# Patient Record
Sex: FEMALE | Race: WHITE | NOT HISPANIC OR LATINO | Employment: FULL TIME | URBAN - METROPOLITAN AREA
[De-identification: names, ages, dates, MRNs, and addresses within clinical notes are randomized per-mention and may not be internally consistent; named-entity substitution may affect disease eponyms.]

---

## 2017-07-28 ENCOUNTER — OFFICE VISIT - RIVER FALLS (OUTPATIENT)
Dept: FAMILY MEDICINE | Facility: CLINIC | Age: 27
End: 2017-07-28

## 2017-07-28 ASSESSMENT — MIFFLIN-ST. JEOR: SCORE: 1209.14

## 2017-08-29 ENCOUNTER — OFFICE VISIT - RIVER FALLS (OUTPATIENT)
Dept: FAMILY MEDICINE | Facility: CLINIC | Age: 27
End: 2017-08-29

## 2017-08-29 ASSESSMENT — MIFFLIN-ST. JEOR: SCORE: 1221.84

## 2017-09-20 ENCOUNTER — OFFICE VISIT - RIVER FALLS (OUTPATIENT)
Dept: FAMILY MEDICINE | Facility: CLINIC | Age: 27
End: 2017-09-20

## 2017-09-20 ASSESSMENT — MIFFLIN-ST. JEOR: SCORE: 1214.58

## 2018-04-19 ENCOUNTER — OFFICE VISIT - RIVER FALLS (OUTPATIENT)
Dept: FAMILY MEDICINE | Facility: CLINIC | Age: 28
End: 2018-04-19

## 2018-04-19 ASSESSMENT — MIFFLIN-ST. JEOR: SCORE: 1283.53

## 2019-03-18 ENCOUNTER — OFFICE VISIT - RIVER FALLS (OUTPATIENT)
Dept: FAMILY MEDICINE | Facility: CLINIC | Age: 29
End: 2019-03-18

## 2019-09-13 ENCOUNTER — OFFICE VISIT - RIVER FALLS (OUTPATIENT)
Dept: FAMILY MEDICINE | Facility: CLINIC | Age: 29
End: 2019-09-13

## 2019-09-13 ASSESSMENT — MIFFLIN-ST. JEOR: SCORE: 1274.46

## 2019-09-18 ENCOUNTER — TRANSFERRED RECORDS (OUTPATIENT)
Dept: HEALTH INFORMATION MANAGEMENT | Facility: CLINIC | Age: 29
End: 2019-09-18

## 2019-09-18 ENCOUNTER — COMMUNICATION - RIVER FALLS (OUTPATIENT)
Dept: FAMILY MEDICINE | Facility: CLINIC | Age: 29
End: 2019-09-18

## 2019-09-18 LAB — HPV ABSTRACT: NORMAL

## 2020-01-13 ENCOUNTER — OFFICE VISIT - RIVER FALLS (OUTPATIENT)
Dept: FAMILY MEDICINE | Facility: CLINIC | Age: 30
End: 2020-01-13

## 2020-01-13 ASSESSMENT — MIFFLIN-ST. JEOR: SCORE: 1294.42

## 2020-10-13 ENCOUNTER — OFFICE VISIT - RIVER FALLS (OUTPATIENT)
Dept: FAMILY MEDICINE | Facility: CLINIC | Age: 30
End: 2020-10-13

## 2020-11-11 ENCOUNTER — OFFICE VISIT - RIVER FALLS (OUTPATIENT)
Dept: FAMILY MEDICINE | Facility: CLINIC | Age: 30
End: 2020-11-11

## 2020-11-11 ASSESSMENT — MIFFLIN-ST. JEOR: SCORE: 1345.46

## 2021-11-15 ENCOUNTER — OFFICE VISIT - RIVER FALLS (OUTPATIENT)
Dept: FAMILY MEDICINE | Facility: CLINIC | Age: 31
End: 2021-11-15

## 2021-11-15 ASSESSMENT — MIFFLIN-ST. JEOR: SCORE: 1337.17

## 2021-12-08 ENCOUNTER — OFFICE VISIT - RIVER FALLS (OUTPATIENT)
Dept: FAMILY MEDICINE | Facility: CLINIC | Age: 31
End: 2021-12-08

## 2021-12-08 ASSESSMENT — MIFFLIN-ST. JEOR: SCORE: 1329

## 2022-01-31 ENCOUNTER — OFFICE VISIT - RIVER FALLS (OUTPATIENT)
Dept: FAMILY MEDICINE | Facility: CLINIC | Age: 32
End: 2022-01-31

## 2022-02-11 VITALS
DIASTOLIC BLOOD PRESSURE: 66 MMHG | HEART RATE: 63 BPM | TEMPERATURE: 97.4 F | OXYGEN SATURATION: 99 % | WEIGHT: 141.4 LBS | SYSTOLIC BLOOD PRESSURE: 106 MMHG

## 2022-02-12 VITALS
HEIGHT: 62 IN | DIASTOLIC BLOOD PRESSURE: 62 MMHG | TEMPERATURE: 98.3 F | OXYGEN SATURATION: 97 % | SYSTOLIC BLOOD PRESSURE: 118 MMHG | BODY MASS INDEX: 26.13 KG/M2 | HEART RATE: 75 BPM | WEIGHT: 142 LBS

## 2022-02-12 VITALS
WEIGHT: 152.3 LBS | TEMPERATURE: 98.6 F | HEIGHT: 61 IN | SYSTOLIC BLOOD PRESSURE: 112 MMHG | OXYGEN SATURATION: 99 % | DIASTOLIC BLOOD PRESSURE: 72 MMHG | DIASTOLIC BLOOD PRESSURE: 62 MMHG | BODY MASS INDEX: 28.42 KG/M2 | OXYGEN SATURATION: 98 % | HEART RATE: 72 BPM | BODY MASS INDEX: 28.75 KG/M2 | WEIGHT: 150.5 LBS | HEART RATE: 59 BPM | TEMPERATURE: 97.9 F | SYSTOLIC BLOOD PRESSURE: 120 MMHG | HEIGHT: 61 IN

## 2022-02-12 VITALS
TEMPERATURE: 98.5 F | HEART RATE: 80 BPM | WEIGHT: 139.6 LBS | DIASTOLIC BLOOD PRESSURE: 66 MMHG | SYSTOLIC BLOOD PRESSURE: 118 MMHG | BODY MASS INDEX: 25.69 KG/M2 | HEIGHT: 62 IN

## 2022-02-12 VITALS
TEMPERATURE: 97.5 F | WEIGHT: 123.2 LBS | DIASTOLIC BLOOD PRESSURE: 60 MMHG | WEIGHT: 126 LBS | SYSTOLIC BLOOD PRESSURE: 124 MMHG | DIASTOLIC BLOOD PRESSURE: 68 MMHG | HEIGHT: 62 IN | BODY MASS INDEX: 22.67 KG/M2 | HEIGHT: 62 IN | TEMPERATURE: 98.9 F | SYSTOLIC BLOOD PRESSURE: 110 MMHG | HEART RATE: 74 BPM | HEART RATE: 62 BPM | BODY MASS INDEX: 23.19 KG/M2

## 2022-02-12 VITALS
TEMPERATURE: 97.9 F | SYSTOLIC BLOOD PRESSURE: 110 MMHG | BODY MASS INDEX: 22.89 KG/M2 | WEIGHT: 124.4 LBS | HEIGHT: 62 IN | DIASTOLIC BLOOD PRESSURE: 70 MMHG | HEART RATE: 56 BPM

## 2022-02-12 VITALS
HEIGHT: 61 IN | HEART RATE: 60 BPM | RESPIRATION RATE: 16 BRPM | BODY MASS INDEX: 29.27 KG/M2 | WEIGHT: 155 LBS | SYSTOLIC BLOOD PRESSURE: 134 MMHG | DIASTOLIC BLOOD PRESSURE: 70 MMHG | TEMPERATURE: 99 F

## 2022-02-12 VITALS
TEMPERATURE: 97 F | WEIGHT: 137.6 LBS | HEART RATE: 72 BPM | DIASTOLIC BLOOD PRESSURE: 70 MMHG | BODY MASS INDEX: 25.32 KG/M2 | SYSTOLIC BLOOD PRESSURE: 128 MMHG | HEIGHT: 62 IN

## 2022-02-15 NOTE — NURSING NOTE
CAGE Assessment Entered On:  11/12/2020 9:15 AM CST    Performed On:  11/11/2020 9:15 AM CST by Shivani Goodman MA               Assessment   Have you ever felt you should cut down on your drinking :   No   Have people annoyed you by criticizing your drinking :   No   Have you ever felt bad or guilty about your drinking :   No   Have you ever taken a drink first thing in the morning to steady your nerves or get rid of a hangover (Eye-opener) :   No   CAGE Score :   0    Shivani Goodman MA - 11/12/2020 9:15 AM CST

## 2022-02-15 NOTE — PROGRESS NOTES
Patient:   RONAK LEWIS            MRN: [id number]            FIN: [id number]               Age:   27 years     Sex:  Female     :  1990   Associated Diagnoses:   XIOMARA (generalized anxiety disorder)   Author:   Kennedi Jasmine      Chief Complaint   2017 10:22 AM CDT   f/u meds        History of Present Illness   concerning symptoms as listed in CC discussed and confirmed with pt  PHQ and CAGE and XIOMARA scoring reviewed with pt--significant improvement  She has been on sertraline about 3 weeks and very pleased with results  Feels much more in control of anxiety  Son started  so that was difficult  Mom notices improvement in symptoms, more level      Review of Systems   All other systems.     Health Status   Allergies:    Allergic Reactions (Selected)  No Known Medication Allergies   Medications:  (Selected)   Prescriptions  Prescribed  sertraline 50 mg oral tablet: 1 tab(s) ( 50 mg ), PO, Daily, # 90 tab(s), 3 Refill(s), Type: Maintenance, Pharmacy: [id number], 1 tab(s) po daily  Documented Medications  Documented  Mirena 52 mg intrauterine device: 1 EA ( 52 mg ), intrauteral, once, Instructions: Lot:  [id number]  Exp:  2018, 0 Refill(s), Type: Maintenance   Problem list:    All Problems  XIOMARA (generalized anxiety disorder) / SNOMED CT 87813518 / Confirmed      Histories   Past Medical History:    No active or resolved past medical history items have been selected or recorded.   Family History:       Procedure history:    No active procedure history items have been selected or recorded.   Social History:        Alcohol Assessment            Current, Wine (5 oz), 1-2 times per week, 1 drinks/episode average.      Tobacco Assessment            Never smoker      Exercise and Physical Activity Assessment            Exercise frequency: 5-6 times/week.  Exercise type: Running.      Sexual Assessment            Sexually active: Yes.  Sexual orientation: Heterosexual.        Physical  Examination   Vital Signs   9/20/2017 10:22 AM CDT Temperature Tympanic 97.9 DegF    Peripheral Pulse Rate 56 bpm  LOW    Pulse Site Radial artery    HR Method Electronic    Systolic Blood Pressure 110 mmHg    Diastolic Blood Pressure 70 mmHg    Mean Arterial Pressure 83 mmHg    BP Site Right arm    BP Method Manual      Measurements from flowsheet : Measurements   9/20/2017 10:22 AM CDT Height Measured - Standard 61.5 in    Weight Measured - Standard 124.4 lb    BSA 1.56 m2    Body Mass Index 23.12 kg/m2      General:  Alert and oriented, No acute distress, good eye contact, engaging with conversation.    Psychiatric:  Cooperative, Appropriate mood & affect, Normal judgment, Non-suicidal.       Impression and Plan   Diagnosis     XIOMARA (generalized anxiety disorder) ([id number] F41.1).     Patient Instructions:  Lifestyle risk factors.         Limitations: Alcohol consumption.         Counseled: Patient, Regarding diagnosis, Regarding treatment, Regarding medications, Diet, Activity, Verbalized understanding, 10 min in counseling,   will continue same dose, if she needs to increase dose can call but plan to stay on this 1 year then re evaluated.    Orders     Orders (Selected)   Prescriptions  Prescribed  sertraline 50 mg oral tablet: 1 tab(s) ( 50 mg ), PO, Daily, # 90 tab(s), 3 Refill(s), Type: Maintenance, Pharmacy: [id number], 1 tab(s) po daily.

## 2022-02-15 NOTE — TELEPHONE ENCOUNTER
Entered by [place] Mark LPN on August 07, 2020 9:39:48 AM CDT  ---------------------  From: [place] Mark LPN   To: [facility] #[id number]    Sent: 8/7/2020 9:39:48 AM CDT  Subject: Medication Management     ** Submitted: **  Order:citalopram (citalopram 20 mg oral tablet)  1 tab(s)  Oral  daily  Qty:  30 tab(s)        Refills:  0          Substitutions Allowed     [facility] #[id number]    Signed by [place] Mark LPN  8/7/2020 2:38:00 PM [facility]    ** Submitted: **  Complete:citalopram (citalopram 20 mg oral tablet)   Signed by [place] Mark LPN  8/7/2020 2:39:00 PM [facility]    ** Not Approved:  **  citalopram (CITALOPRAM 20MG TABLETS)  TAKE 1 TABLET BY MOUTH DAILY  Qty:  90 tab(s)        Days Supply:  90        Refills:  0          Substitutions Allowed     [facility][id number]   Signed by [place] Mark LPN            ------------------------------------------  From: [facility] #[id number]  To: Kennedi Jasmine  Sent: August 7, 2020 3:43:07 AM CDT  Subject: Medication Management  Due: July 28, 2020 10:28:08 PM CDT     ** On Hold Pending Signature **     Dispensed Drug: citalopram (citalopram 20 mg oral tablet), TAKE 1 TABLET BY MOUTH DAILY  Quantity: 90 tab(s)  Days Supply: 90  Refills: 0  Substitutions Allowed  Notes from Pharmacy:  ------------------------------------------

## 2022-02-15 NOTE — NURSING NOTE
Generalized Anxiety Disorder Screening Entered On:  3/19/2019 8:27 AM CDT    Performed On:  3/18/2019 8:26 AM CDT by Faith Thibodeaux MA               Generalized Anxiety Disorder Screening   XIOMARA Nervous, Anxious On Edge :   Several days   XIOMARA Control Worrying B :   Not at all   XIOMARA Worrying Too Much :   Several days   XIOMARA Restless :   Several days   XIOMARA Easily Annoyed/Irritable :   Not at all   XIOMARA Afraid :   Not at all   XIOMARA Trouble Relaxing :   Several days   XIOMARA Total Screening Score :   4    XIOMARA Difficulty with Work, Home, Others :   Not difficult at all   Faith Thibodeaux MA - 3/19/2019 8:26 AM CDT

## 2022-02-15 NOTE — NURSING NOTE
Comprehensive Intake Entered On:  11/11/2020 2:08 PM CST    Performed On:  11/11/2020 2:01 PM CST by Alfredo Hugo CMA               Summary   Chief Complaint :   Pt here for Px and medication refills.   Menstrual Status :   Prophylaxis   Weight Measured :   155 lb(Converted to: 155 lb 0 oz, 70.307 kg)    Height Measured :   61 in(Converted to: 5 ft 1 in, 154.94 cm)    Body Mass Index :   29.28 kg/m2 (HI)    Body Surface Area :   1.74 m2   Systolic Blood Pressure :   134 mmHg (HI)    Diastolic Blood Pressure :   70 mmHg   Mean Arterial Pressure :   91 mmHg   Peripheral Pulse Rate :   60 bpm   BP Site :   Right arm   Pulse Site :   Radial artery   Temperature Tympanic :   99 DegF(Converted to: 37.2 DegC)    Respiratory Rate :   16 br/min   Alfredo Hugo CMA - 11/11/2020 2:01 PM CST   Health Status   Allergies Verified? :   Yes   Medication History Verified? :   Yes   Medical History Verified? :   Yes   Pre-Visit Planning Status :   Completed   Tobacco Use? :   Former smoker   Alfredo Hugo CMA - 11/11/2020 2:01 PM CST   Meds / Allergies   (As Of: 11/11/2020 2:08:19 PM CST)   Allergies (Active)   No Known Medication Allergies  Estimated Onset Date:   Unspecified ; Created By:   Raven London CMA; Reaction Status:   Active ; Category:   Drug ; Substance:   No Known Medication Allergies ; Type:   Allergy ; Updated By:   Raven London CMA; Reviewed Date:   11/11/2020 2:05 PM CST        Medication List   (As Of: 11/11/2020 2:08:19 PM CST)   Prescription/Discharge Order    fluticasone nasal  :   fluticasone nasal ; Status:   Prescribed ; Ordered As Mnemonic:   Flonase 50 mcg/inh nasal spray ; Simple Display Line:   2 spray(s), Nasal, daily, 1 bottle(s), 0 Refill(s) ; Ordering Provider:   Elsa Amaya MD; Catalog Code:   fluticasone nasal ; Order Dt/Tm:   1/13/2020 6:29:08 PM CST          drospirenone-ethinyl estradiol  :   drospirenone-ethinyl estradiol ; Status:   Prescribed ; Ordered As Mnemonic:    drospirenone-ethinyl estradiol 3 mg-0.02 mg oral tablet ; Simple Display Line:   1 tab(s), Oral, daily, 28 tab(s), 0 Refill(s) ; Ordering Provider:   Kennedi Jasmine; Catalog Code:   drospirenone-ethinyl estradiol ; Order Dt/Tm:   10/23/2020 6:06:25 AM CDT          citalopram  :   citalopram ; Status:   Prescribed ; Ordered As Mnemonic:   citalopram 20 mg oral tablet ; Simple Display Line:   1 tab(s), Oral, daily, appt scheduled for 10/13/20 - will address additional refills at that time, 30 tab(s), 0 Refill(s) ; Ordering Provider:   Kennedi Jasmine; Catalog Code:   citalopram ; Order Dt/Tm:   9/29/2020 9:58:41 AM CDT            Home Meds    levonorgestrel  :   levonorgestrel ; Status:   Documented ; Ordered As Mnemonic:   Mirena 52 mg intrauterine device ; Simple Display Line:   52 mg, 1 EA, intrauteral, once, Lot:  [id number]  Exp:  09/2018, 0 Refill(s) ; Catalog Code:   levonorgestrel ; Order Dt/Tm:   7/29/2016 3:53:30 PM CDT            ID Risk Screen   Recent Travel History :   No recent travel   Family Member Travel History :   No recent travel   Other Exposure to Infectious Disease :   Unknown   Alfredo Hugo CMA - 11/11/2020 2:01 PM CST   Social History   Social History   (As Of: 11/11/2020 2:08:19 PM CST)   Alcohol:        Current, Wine (5 oz), 1-2 times per month, 2 drinks/episode average.  Ready to change: No.   (Last Updated: 9/25/2019 2:24:28 PM CDT by Maribell Ruiz)          Tobacco:        Never smoker   (Last Updated: 8/1/2016 7:11:48 PM CDT by Raven London CMA)   Former smoker, quit more than 30 days ago   Comments:  9/25/2019 2:22 PM - Maribell Ruiz: Quit August 2009   (Last Updated: 9/25/2019 2:22:30 PM CDT by Maribell Ruiz)          Substance Abuse:        Never   (Last Updated: 9/25/2019 2:24:10 PM CDT by Maribell Ruiz)          Employment/School:        Employed, Work/School description: .  Highest education level: MRA .   (Last Updated: 9/25/2019 2:25:17 PM CDT by Maribell Ruiz)           Home/Environment:        Marital status: .  2 children.  Living situation: Home/Independent.  Injuries/Abuse/Neglect in household: No.  Feels unsafe at home: No.  Family/Friends available for support: Yes.   (Last Updated: 9/25/2019 2:26:34 PM CDT by Maribell Ruiz)          Nutrition/Health:        Type of diet: Regular.  Wants to lose weight: Yes.  Sleeping concerns: No.  Feels highly stressed: Yes.   (Last Updated: 9/25/2019 2:26:16 PM CDT by Maribell Ruiz)          Exercise:        Exercise frequency: 5-6 times/week.  Exercise type: Running, Walking, Yoga.   (Last Updated: 9/25/2019 2:26:26 PM CDT by Maribell Ruiz)          Sexual:        Sexually active: Yes.  Sexual orientation: Heterosexual.   (Last Updated: 8/1/2016 7:12:57 PM CDT by Raven London CMA)   Sexually active: Yes.  Identifies as female, Sexual orientation: Straight or heterosexual.  History of STD: No.  Uses condoms: No.  Contraceptive Use Details: Intrauterine device.   (Last Updated: 9/25/2019 2:26:53 PM CDT by Maribell Ruiz)

## 2022-02-15 NOTE — TELEPHONE ENCOUNTER
Entered by [place] Mark LPN on September 29, 2020 10:00:18 AM CDT  ---------------------  From: [place] Mark LPN   [id number]    Sent: 9/29/2020 10:00:18 AM CDT  Subject: Medication Management     ** Submitted: **  Order:drospirenone-ethinyl estradiol (drospirenone-ethinyl estradiol 3 mg-0.02 mg oral tablet)  1 tab(s)  Oral  daily  appt scheduled 10/13/20 - will address additional refills at that time  Qty:  28 tab(s)        Days Supply:  28        Refills:  0          Substitutions Allowed     Route To [facility] #[id number]    Signed by [place] Mark LPN  9/29/2020 2:59:00 PM [place]    ** Submitted: **  Complete:drospirenone-ethinyl estradiol (drospirenone-ethinyl estradiol 3 mg-0.02 mg oral tablet)   Signed by [place] Mark LPN  9/29/2020 3:00:00 PM [place]    ** Not Approved:  **  drospirenone-ethinyl estradiol (DROSPIRENONE/ETHY EST 3/0.02MG T 28)  TAKE 1 TABLET BY MOUTH DAILY  Qty:  28 tab(s)        Days Supply:  28        Refills:  0          Substitutions Allowed     Route To [facility] #[id number]   Signed by [place] Mark LPN            ** Submitted: **  Order:citalopram (citalopram 20 mg oral tablet)  1 tab(s)  Oral  daily  appt scheduled for 10/13/20 - will address additional refills at that time  Qty:  30 tab(s)        Days Supply:  30        Refills:  0          Substitutions Allowed     [id number]    Signed by [place] Mark LPN  9/29/2020 2:58:00 PM [place]    ** Submitted: **  Complete:citalopram (citalopram 20 mg oral tablet)   Signed by [place] Mark LPN  9/29/2020 2:59:00 PM UTC    ** Not Approved:  **  citalopram (CITALOPRAM 20MG TABLETS)  TAKE 1 TABLET BY MOUTH DAILY  Qty:  30 tab(s)        Days Supply:  30        Refills:  0          Substitutions Allowed     Route To Pharmacy - [facility] #[id number]   Signed by Jas WILSON,  [place]            ------------------------------------------  From: [facility] #[id number]  To: Kennedi Jasmine  Sent: September 27, 2020 9:03:12 AM CDT  Subject: Medication Management  Due: September 9, 2020 4:21:06 PM CDT     ** On Hold Pending Signature **     Dispensed Drug: drospirenone-ethinyl estradiol (drospirenone-ethinyl estradiol 3 mg-0.02 mg oral tablet), TAKE 1 TABLET BY MOUTH DAILY  Quantity: 28 tab(s)  Days Supply: 28  Refills: 0  Substitutions Allowed  Notes from Pharmacy:     ** On Hold Pending Signature **     Dispensed Drug: citalopram (citalopram 20 mg oral tablet), TAKE 1 TABLET BY MOUTH DAILY  Quantity: 30 tab(s)  Days Supply: 30  Refills: 0  Substitutions Allowed  Notes from Pharmacy:  ------------------------------------------Renewed both meds x1 month - patient is due for annual exam. She does have an appt scheduled for 10/13/20

## 2022-02-15 NOTE — TELEPHONE ENCOUNTER
---------------------  From: Cassandra Adamson LPN (Phone Messages Pool ([id number]_[facility]))   To: Advanced Practice Provider Pool ([id number]_[facility]);     Sent: 6/26/2020 10:14:01 AM CDT  Subject: OCP     Phone Message    PCP:   [place]      Time of Call:  9:47am       Person Calling:  pt  Phone number:  [phone]    Returned call at: _    Note:   Pt LM asking for refill on OCP at [facility].    Rx was denied this morning as pt's Rx should be good until August.    Called [facility] and they said insurance only allows 30 days at a time. Pt filled 2 times in February, 3/20/20, 4/14/20, 5/8/20 and 5/31/20.    Please advise on further refills. Rx is not for continuous use so I am not comfortable filling per protocol    Last office visit and reason:  1/13/20 Right OM, sinusitis with KSA---------------------  From: Reid Srinivasan PA-C (Advanced Practice Provider Pool ([id number]_[facility]))   To: Phone Messages Pool ([id number]_[place] [facility] [place]);     Sent: 6/26/2020 11:32:03 AM CDT  Subject: RE: OCP     Ok to fill until August.    KAHMedication sent in per KAH. Patient notified.

## 2022-02-15 NOTE — LETTER
(Inserted Image. Unable to display)   July 02, 2019        RONAK LEWIS  [address], [place] [id number]        Dear RONAK,      Thank you for selecting [facility] ([facility]viously [place]) for your healthcare needs.    Our records indicate you are due for the following services:     Annual Physical and Gynecologic Exam ~ Yearly wellness exams are important for your ongoing health and wellness.  This exam gives you the opportunity to meet with your Healthcare Provider to review your health, update immunizations and to recommend preventive screenings that you may be due for.  At your wellness visit, your health care provider will determine the need for a gynecologic exam and/or pap smear.     To schedule an appointment or if you have further questions, please contact your primary clinic:   Replaced by [facility]       [phone]   [facility]       [phone]              [facility]     [phone]      Powered by [employer] and [employer]    Sincerely,    PRABHAKAR Mix

## 2022-02-15 NOTE — NURSING NOTE
Comprehensive Intake Entered On:  1/13/2020 6:22 PM CST    Performed On:  1/13/2020 6:19 PM CST by Esther Jacobson CMA               Summary   Chief Complaint :   Cold sx since Maya which have not quite resolved - still sinus pressure/drainage, R ear feels plugged.    Menstrual Status :   Menarcheal   Weight Measured :   142 lb(Converted to: 142 lb 0 oz, 64.41 kg)    Height Measured :   61.5 in(Converted to: 5 ft 1 in, 156.21 cm)    Body Mass Index :   26.39 kg/m2 (HI)    Body Surface Area :   1.67 m2   Systolic Blood Pressure :   118 mmHg   Diastolic Blood Pressure :   62 mmHg   Mean Arterial Pressure :   81 mmHg   Peripheral Pulse Rate :   75 bpm   BP Site :   Right arm   Pulse Site :   Radial artery   BP Method :   Manual   HR Method :   Electronic   Temperature Tympanic :   98.3 DegF(Converted to: 36.8 DegC)    Oxygen Saturation :   97 %   Esther Jacobson CMA - 1/13/2020 6:19 PM CST   Health Status   Allergies Verified? :   Yes   Medication History Verified? :   Yes   Pre-Visit Planning Status :   Not completed   Esther Jacobson CMA - 1/13/2020 6:19 PM CST   Meds / Allergies   (As Of: 1/13/2020 6:22:18 PM CST)   Allergies (Active)   No Known Medication Allergies  Estimated Onset Date:   Unspecified ; Created By:   Raven London CMA; Reaction Status:   Active ; Category:   Drug ; Substance:   No Known Medication Allergies ; Type:   Allergy ; Updated By:   Raven London CMA; Reviewed Date:   9/13/2019 11:03 AM CDT        Medication List   (As Of: 1/13/2020 6:22:18 PM CST)   Prescription/Discharge Order    citalopram  :   citalopram ; Status:   Prescribed ; Ordered As Mnemonic:   citalopram 20 mg oral tablet ; Simple Display Line:   20 mg, 1 tab(s), Oral, daily, 90 tab(s), 1 Refill(s) ; Ordering Provider:   Kennedi Jasmine; Catalog Code:   citalopram ; Order Dt/Tm:   9/13/2019 11:17:23 AM CDT          drospirenone-ethinyl estradiol  :   drospirenone-ethinyl estradiol ; Status:   Prescribed ; Ordered As  Mnemonic:   Delaney 3 mg-0.02 mg oral tablet ; Simple Display Line:   1 tab(s), Oral, daily, 84 tab(s), 1 Refill(s) ; Ordering Provider:   Kennedi Jasmine; Catalog Code:   drospirenone-ethinyl estradiol ; Order Dt/Tm:   9/13/2019 11:17:51 AM CDT            Home Meds    levonorgestrel  :   levonorgestrel ; Status:   Documented ; Ordered As Mnemonic:   Mirena 52 mg intrauterine device ; Simple Display Line:   52 mg, 1 EA, intrauteral, once, Lot:  [id number]  Exp:  09/2018, 0 Refill(s) ; Catalog Code:   levonorgestrel ; Order Dt/Tm:   7/29/2016 3:53:30 PM CDT

## 2022-02-15 NOTE — TELEPHONE ENCOUNTER
Entered by Court Bird CMA on October 23, 2020 6:07:03 AM CDT  ---------------------  From: Court Bird CMA   To: [facility] #[id number]    Sent: 10/23/2020 6:07:03 AM CDT  Subject: Medication Management     ** Submitted: **  Order:drospirenone-ethinyl estradiol (drospirenone-ethinyl estradiol 3 mg-0.02 mg oral tablet)  1 tab(s)  Oral  daily  Qty:  28 tab(s)        Days Supply:  28        Refills:  0          Substitutions Allowed     Route To [facility] #[id number]    Signed by Court Bird CMA  10/23/2020 11:06:00 AM [facility]    ** Submitted: **  Complete:drospirenone-ethinyl estradiol (drospirenone-ethinyl estradiol 3 mg-0.02 mg oral tablet)   Signed by Court Bird CMA  10/23/2020 11:07:00 AM [facility]    ** Not Approved:  **  drospirenone-ethinyl estradiol (DROSPIRENONE/ETHY EST 3/0.02MG T 28)  TAKE 1 TABLET BY MOUTH DAILY APPOINTMENT SCHEDULED 10/13/20- WILL ADDRESS ADDITIONAL REFILLS AT THAT TIME  Qty:  28 tab(s)        Days Supply:  28        Refills:  0          Substitutions Allowed     Route To [facility] #[id number]   Signed by Court Bird CMA            ------------------------------------------  From: [facility] #[id number]  To: Kennedi Jasmine  Sent: October 22, 2020 11:42:19 AM CDT  Subject: Medication Management  Due: October 8, 2020 2:03:37 PM CDT     ** On Hold Pending Signature **     Dispensed Drug: drospirenone-ethinyl estradiol (drospirenone-ethinyl estradiol 3 mg-0.02 mg oral tablet), TAKE 1 TABLET BY MOUTH DAILY APPOINTMENT SCHEDULED 10/13/20- WILL ADDRESS ADDITIONAL REFILLS AT THAT TIME  Quantity: 28 tab(s)  Days Supply: 28  Refills: 0  Substitutions Allowed  Notes from Pharmacy:  ------------------------------------------has px scheduled 11/11/20

## 2022-02-15 NOTE — PROGRESS NOTES
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     Health Status   Allergies:    Allergic Reactions (Selected)  No Known Medication Allergies   Medications:  (Selected)   Prescriptions  Prescribed  citalopram 10 mg oral tablet: See Instructions, Instructions: 1 tab daily but increase to 2 tabs daily each month for 10 days prior to expected menses, # 120 tab(s), 3 Refill(s), Type: Maintenance, Pharmacy: [id number], 1 tab daily but increase to 2 tabs daily each...  Documented Medications  Documented  Mirena 52 mg intrauterine device: 1 EA ( 52 mg ), intrauteral, once, Instructions: Lot:  [id number]  Exp:  2018, 0 Refill(s), Type: Maintenance   Problem list:    All Problems  XIOMARA (generalized anxiety disorder) / SNOMED CT 98140492 / Confirmed      Histories   Past Medical History:    No active or resolved past medical history items have been selected or recorded.   Family History:       Procedure history:    No active procedure history items have been selected or recorded.   Social History:        Alcohol Assessment            Current, Wine (5 oz), 1-2 times per week, 1 drinks/episode average.      Tobacco Assessment            Never smoker       Exercise and Physical Activity Assessment            Exercise frequency: 5-6 times/week.  Exercise type: Running.      Sexual Assessment            Sexually active: Yes.  Sexual orientation: Heterosexual.      Physical Examination   Vital Signs   3/18/2019 12:49 PM CDT Temperature Tympanic 97.4 DegF  LOW    Peripheral Pulse Rate 63 bpm    HR Method Electronic    Systolic Blood Pressure 106 mmHg    Diastolic Blood Pressure 66 mmHg    Mean Arterial Pressure 79 mmHg    BP Site Right arm    BP Method Manual    Oxygen Saturation 99 %      Measurements from flowsheet : Measurements   3/18/2019 12:49 PM CDT   Weight Measured - Standard                141.4 lb     General:  Alert and oriented, No acute distress, good eye contact, engaging with conversation.    Integumentary:  Warm, Dry, Pink.    Neurologic:  Alert, Oriented, Normal sensory, Normal motor function, No focal deficits.    Psychiatric:  Cooperative, Appropriate mood & affect, Normal judgment, Non-suicidal.       Impression and Plan   Diagnosis     XIOAMRA (generalized anxiety disorder) (CUE31-DQ F41.1).     Patient Instructions:  Lifestyle risk factors.         Limitations: Alcohol consumption.         Counseled: Patient, Regarding diagnosis, Regarding treatment, Regarding medications, Diet, Activity, Verbalized understanding, counseled 15 min on possible causes of cyclical anxiety, likely low progesterone level  could add back progesterone 7-10 days prior to period or increase Citalopram each month during those 10 days of cycle. She would like to do the latter and she will see me in 3-6 months for annual exam as Pap will be due.    Orders     Orders (Selected)   Prescriptions  Prescribed  citalopram 10 mg oral tablet: See Instructions, Instructions: 1 tab daily but increase to 2 tabs daily each month for 10 days prior to expected menses, # 120 tab(s), 3 Refill(s), Type: Maintenance, Pharmacy: [id number], 1 tab daily but increase to 2 tabs daily  each....

## 2022-02-15 NOTE — PROGRESS NOTES
Patient:   RONAK LEWIS            MRN: [id number]            FIN: [id number]               Age:   30 years     Sex:  Female     :  1990   Associated Diagnoses:   Well adult; XIOMARA (generalized anxiety disorder)   Author:   Kennedi Jasmine      Visit Information      Date of Service: 2020 01:56 pm  Performing Location: [facility]  Encounter#: [id number]      Primary Care Provider (PCP):  Kennedi Jasmine    NPI# [id number]      Referring Provider:  Kennedi Jasmine NPI# [id number]      Chief Complaint   2020 2:01 PM CST   Pt here for Px and medication refills.        Well Adult History   Well Adult History             The patient presents for well adult exam, Ronak is doing well, partnering with Jose is raising the kids now both school aged  working at home, parents/in laws helping with kid's schoolling  Declines flu shot  pap is UTD  Really happy with using oc's on top of Mirena as it helps emmensely with cystic acne and with mood. She is concerned about #15 wt gain, has gained wt with oc us in past. She has no menses with mirena. Informed it is now approved for contraception for 6 years. She will try cutting oc's in past to help with the above and hopefully help with some wt loss.  The general health status is good.  The patient's diet is described as balanced.  Exercise: routine, running nearly 3 miles daily.  Associated symptoms consist of weight gain.  Last menstrual period:.  Medical encounters:.        Review of Systems   Constitutional:  Negative except as documented in history of present illness.    Eye:  Negative except as documented in history of present illness.    Respiratory:  Negative except as documented in history of present illness.    Cardiovascular:  Negative except as documented in history of present illness.    Breast:  Negative.    Gastrointestinal:  Negative except as documented in history of present illness.    Genitourinary:  Negative except as  documented in history of present illness.    Gynecologic:  Negative except as documented in history of present illness.    Hematology/Lymphatics:  Negative except as documented in history of present illness.    Endocrine:  Negative except as documented in history of present illness.    Immunologic:  Negative except as documented in history of present illness.    Musculoskeletal:  Negative except as documented in history of present illness.    Integumentary:  Negative except as documented in history of present illness.    Neurologic:  Negative except as documented in history of present illness.    Psychiatric:  Negative except as documented in history of present illness.              Health Status   Allergies:    Allergic Reactions (Selected)  No Known Medication Allergies   Medications:  (Selected)   Prescriptions  Prescribed  Flonase 50 mcg/inh nasal spray: = 2 spray(s), Nasal, daily, # 1 bottle(s), 0 Refill(s), Type: Maintenance, Pharmacy: [facility] #[id number], 2 spray(s) Nasal daily  citalopram 20 mg oral tablet: = 1 tab(s), Oral, daily, # 90 tab(s), 3 Refill(s), Type: Maintenance, Pharmacy: [facility] #[id number], 1 tab(s) Oral daily, 61, in, 11/11/20 14:01:00 CST, Height Measured, 155, lb, 11/11/20 14:01:00 CST, Weight Measured  drospirenone-ethinyl estradiol 3 mg-0.02 mg oral tablet: 1 tab(s), Oral, daily, # 84 tab(s), 3 Refill(s), Type: Maintenance, Pharmacy: [facility] #[id number], 1 tab(s) Oral daily, 61, in, 11/11/20 14:01:00 CST, Height Measured, 155, lb, 11/11/20 14:01:00 CST, Weight Measured  Documented Medications  Documented  Mirena 52 mg intrauterine device: 1 EA ( 52 mg ), intrauteral, once, Instructions: Lot:  [id number]  Exp:  09/2018, 0 Refill(s), Type: Maintenance   Problem list:    All Problems  Anxiety / SNOMED CT 59324525 / Confirmed  XIOMARA (generalized anxiety disorder) / SNOMED CT 43958525 / Confirmed      Histories   Past Medical History:    Active  Anxiety (10004449)   Family  History:    Grandfather (M)  High blood pressure  Migraine  Great Grandmother (M)  Alzheimer's disease  Grandparent  Depression  Mother  Depression  High blood pressure  High cholesterol  Migraine  Father    History is negative.  Sister    History is negative.  Sister    History is negative.  Son: Lb      History is negative.  Son: Stephen      History is negative.     Procedure history:    No active procedure history items have been selected or recorded.   Social History:        Alcohol Assessment            Current, Wine (5 oz), 1-2 times per month, 2 drinks/episode average.  Ready to change: No.      Tobacco Assessment            Never smoker            Former smoker, quit more than 30 days ago                     Comments:                      09/25/2019 - Beer , Candy                     Quit August 2009      Substance Abuse Assessment            Never      Employment and Education Assessment            Employed, Work/School description: .  Highest education level: [employer] .      Home and Environment Assessment            Marital status: .  2 children.  Living situation: Home/Independent.  Injuries/Abuse/Neglect in               household: No.  Feels unsafe at home: No.  Family/Friends available for support: Yes.      Nutrition and Health Assessment            Type of diet: Regular.  Wants to lose weight: Yes.  Sleeping concerns: No.  Feels highly stressed: Yes.      Exercise and Physical Activity Assessment            Exercise frequency: 5-6 times/week.  Exercise type: Running, Walking, Yoga.      Sexual Assessment            Sexually active: Yes.  Sexual orientation: Heterosexual.            Sexually active: Yes.  Identifies as female, Sexual orientation: Straight or heterosexual.  History of STD:               No.  Uses condoms: No.  Contraceptive Use Details: Intrauterine device.        Physical Examination   Vital Signs   11/11/2020 2:01 PM CST Temperature Tympanic 99 DegF    Peripheral  Pulse Rate 60 bpm    Pulse Site Radial artery    Respiratory Rate 16 br/min    Systolic Blood Pressure 134 mmHg  HI    Diastolic Blood Pressure 70 mmHg    Mean Arterial Pressure 91 mmHg    BP Site Right arm      Measurements from flowsheet : Measurements   11/11/2020 2:01 PM CST Height Measured - Standard 61 in    Weight Measured - Standard 155 lb    BSA 1.74 m2    Body Mass Index 29.28 kg/m2  HI      General:  Alert and oriented, No acute distress, vital signs stable, as noted above.    Eye:  Pupils are equal, round and reactive to light, Extraocular movements are intact, Normal conjunctiva.    HENT:  Normocephalic, Tympanic membranes are clear, Normal hearing.    Neck:  Supple, Non-tender, No lymphadenopathy, No thyromegaly.    Respiratory:  Lungs are clear to auscultation, Respirations are non-labored, Breath sounds are equal, Symmetrical chest wall expansion.    Cardiovascular:  Normal rate, Regular rhythm, No murmur, No edema.    Breast:  No mass, No tenderness, No discharge, Breasts examined .  No infra nor supraclavicular nodes palpable.  No axillary nodes or masses palpable.  No nipple discharge. Breasts normal throughout.    Gastrointestinal:  Soft, Non-tender, Non-distended, No organomegaly.    Musculoskeletal:  Normal range of motion, Normal strength, No deformity, Normal gait.    Integumentary:  Warm, Dry, Pink, Intact, No rash.    Neurologic:  Alert, Oriented, Normal sensory, Normal motor function, Cranial Nerves II-XII are grossly intact.    Psychiatric:  Cooperative, Appropriate mood & affect, Normal judgment, PHQ 9/CAGE questionaire reviewed and discussed with patient,  see score.       Impression and Plan   Diagnosis     Well adult (ZZU48-GC Z00.00).     XIOMARA (generalized anxiety disorder) (NGJ04-SV F41.1).     Patient Instructions:       Counseled: Patient, Regarding diagnosis, Regarding medications, Verbalized understanding, counseled on health benefits of healthy weight, regular exercise, healthy  diet.    Orders     Orders (Selected)   Prescriptions  Prescribed  citalopram 20 mg oral tablet: = 1 tab(s), Oral, daily, # 90 tab(s), 3 Refill(s), Type: Maintenance, Pharmacy: [facility] #[id number], 1 tab(s) Oral daily, 61, in, 11/11/20 14:01:00 CST, Height Measured, 155, lb, 11/11/20 14:01:00 CST, Weight Measured  drospirenone-ethinyl estradiol 3 mg-0.02 mg oral tablet: 1 tab(s), Oral, daily, # 84 tab(s), 3 Refill(s), Type: Maintenance, Pharmacy: [facility] #[id number], 1 tab(s) Oral daily, 61, in, 11/11/20 14:01:00 CST, Height Measured, 155, lb, 11/11/20 14:01:00 CST, Weight Measured.     will continue current dose of citalopram but if wants to reduce she will call.

## 2022-02-15 NOTE — NURSING NOTE
Generalized Anxiety Disorder Screening Entered On:  11/16/2021 7:11 AM CST    Performed On:  11/16/2021 7:11 AM CST by [place] Mark LPN               XIOMARA-7   XIOMARA Nervous, Anxious On Edge :   Several days   XIOMARA Control Worrying B :   Several days   XIOMARA Worrying Too Much :   Not at all   XIOMARA Trouble Relaxing :   Several days   XIOMARA Restless :   Several days   XIOMARA Easily Annoyed/Irritable :   Not at all   XIOMARA Afraid :   Not at all   XIOMARA Total Screening Score :   4    XIOMARA Difficulty with Work, Home, Others :   Not difficult at all   [place] Mark LPN - 11/16/2021 7:11 AM CST

## 2022-02-15 NOTE — PROGRESS NOTES
Patient:   RONAK LEWIS            MRN: [id number]            FIN: [id number]               Age:   31 years     Sex:  Female     :  1990   Associated Diagnoses:   Well adult; XIOMARA (generalized anxiety disorder); IUD check up   Author:   Kennedi Jasmine      Visit Information      Date of Service: 11/15/2021 11:52 am  Performing Location: [facility]  Encounter#: [id number]      Primary Care Provider (PCP):  Kennedi Jasmine    NPI# [id number]      Referring Provider:  Kennedi Jsamine NPI# [id number]      Chief Complaint   11/15/2021 12:03 PM CST  1) annual exam 2) check IUD, placed 16, not having any issues        Well Adult History   Well Adult History             The patient presents for well adult exam,  to Jose, raising 7 an d9 year old, has their COVID vaccines scheduled, she has her booster scheduled next weel  Happy with  Mirena, has been just over 5 years.   , she plans to replace next summer. She also uses a half of low dose OC to help 'balance my hormones'. She periodically stops it but simply feels better when she is on it  Citalopram for XIOMARA, well controlled. Meds refilled  pap is UTD  no fh breast or colon cancer.  The general health status is good.  The patient's diet is described as balanced.  Exercise: routine, runs 3-4 miles at lunch daily.  Associated symptoms consist of none.  Last menstrual period: none.  Medical encounters:.        Review of Systems   Constitutional:  Negative except as documented in history of present illness.    Eye:  Negative except as documented in history of present illness.    Respiratory:  Negative except as documented in history of present illness.    Cardiovascular:  Negative except as documented in history of present illness.    Breast:  Negative.    Gastrointestinal:  Negative except as documented in history of present illness.    Genitourinary:  Negative except as documented in history of present illness.    Gynecologic:   Negative except as documented in history of present illness.    Hematology/Lymphatics:  Negative except as documented in history of present illness.    Endocrine:  Negative except as documented in history of present illness.    Immunologic:  Negative except as documented in history of present illness.    Musculoskeletal:  Negative except as documented in history of present illness.    Integumentary:  Negative except as documented in history of present illness.    Neurologic:  Negative except as documented in history of present illness.    Psychiatric:  Negative except as documented in history of present illness.              Health Status   Allergies:    Allergic Reactions (Selected)  No Known Medication Allergies   Medications:  (Selected)   Prescriptions  Prescribed  citalopram 20 mg oral tablet: = 1 tab(s), Oral, daily, # 30 tab(s), 0 Refill(s), Type: Maintenance, Pharmacy: [facility] #[id number], 1 tab(s) Oral daily, 61.25, in, 11/15/21 12:03:00 CST, Height Measured, 152.3, lb, 11/15/21 12:03:00 CST, Weight Measured  drospirenone-ethinyl estradiol 3 mg-0.02 mg oral tablet: 1 tab(s), Oral, daily, # 84 tab(s), 0 Refill(s), Type: Maintenance, Pharmacy: [facility] #[id number], 1 tab(s) Oral daily, 61.25, in, 11/15/21 12:03:00 CST, Height Measured, 152.3, lb, 11/15/21 12:03:00 CST, Weight Measured  Documented Medications  Documented  Mirena 52 mg intrauterine device: 1 EA ( 52 mg ), intrauteral, once, Instructions: Lot:  [id number]  Exp:  09/2018, 0 Refill(s), Type: Maintenance  Vitamin C: Oral, daily, 0 Refill(s), Type: Maintenance,    Medications          *denotes recorded medication          *Vitamin C: Oral, daily, 0 Refill(s).          citalopram 20 mg oral tablet: 1 tab(s), Oral, daily, 30 tab(s), 0 Refill(s).          drospirenone-ethinyl estradiol 3 mg-0.02 mg oral tablet: 1 tab(s), Oral, daily, 84 tab(s), 0 Refill(s).          *Mirena 52 mg intrauterine device: 52 mg, 1 EA, intrauteral, once, Lot:   [id number]  Exp:  09/2018, 0 Refill(s).       Problem list:    All Problems  XIOMAAR (generalized anxiety disorder) / SNOMED CT 80583878 / Confirmed  Anxiety / SNOMED CT 72960750 / Confirmed      Histories   Past Medical History:    Active  Anxiety (07527861)   Family History:    Grandmother (M)  Depression  Grandfather (P)  CA - Lung cancer  Grandfather (M)  High blood pressure  Migraine  Hypercholesterolemia  Great Grandmother (M)  Alzheimer's disease  Grandparent  Depression  Mother  Depression  High blood pressure  High cholesterol  Migraine  Hypercholesterolemia  Father    History is negative.  Sister    History is negative.  Sister    History is negative.  Son: Lb      History is negative.  Son: Silvan      History is negative.     Procedure history:    No active procedure history items have been selected or recorded.   Social History:        Electronic Cigarette/Vaping Assessment: Denies Electronic Cigarette Use            Electronic Cigarette Use: Never.      Alcohol Assessment            Current, Wine (5 oz), 1-2 times per month, 2 drinks/episode average.  Ready to change: No.      Tobacco Assessment: Denies Tobacco Use            Former smoker, quit more than 30 days ago      Substance Abuse Assessment            Never      Employment and Education Assessment            Employed, Work/School description: .  Highest education level: [employer] .      Home and Environment Assessment            Marital status: .  2 children.  Living situation: Home/Independent.  Injuries/Abuse/Neglect in               household: No.  Feels unsafe at home: No.  Family/Friends available for support: Yes.      Nutrition and Health Assessment            Type of diet: Regular.  Wants to lose weight: Yes.  Sleeping concerns: No.  Feels highly stressed: Yes.      Exercise and Physical Activity Assessment            Exercise frequency: 5-6 times/week.  Exercise type: Running, Walking, Yoga.      Sexual Assessment             Sexually active: Yes.  Sexual orientation: Heterosexual.            Sexually active: Yes.  Identifies as female, Sexual orientation: Straight or heterosexual.  History of STD:               No.  Uses condoms: No.  Contraceptive Use Details: Intrauterine device.        Physical Examination   Vital Signs   11/15/2021 12:03 PM CST Temperature Tympanic 97.9 DegF    Peripheral Pulse Rate 72 bpm    Systolic Blood Pressure 112 mmHg    Diastolic Blood Pressure 72 mmHg    Mean Arterial Pressure 85 mmHg    BP Site Right arm    BP Method Manual    Oxygen Saturation 98 %      Measurements from flowsheet : Measurements   11/15/2021 12:03 PM CST Height Measured - Standard 61.25 in    Weight Measured - Standard 152.3 lb    BSA 1.73 m2    Body Mass Index 28.54 kg/m2  HI      General:  Alert and oriented, No acute distress, vital signs stable, as noted above.    Eye:  Pupils are equal, round and reactive to light, Extraocular movements are intact, Normal conjunctiva.    HENT:  Normocephalic, Tympanic membranes are clear, Normal hearing, Oral mucosa is moist, No pharyngeal erythema.    Neck:  Supple, Non-tender, No lymphadenopathy, No thyromegaly.    Respiratory:  Lungs are clear to auscultation, Respirations are non-labored, Breath sounds are equal, Symmetrical chest wall expansion.    Cardiovascular:  Normal rate, Regular rhythm, No murmur, No edema.    Breast:  No mass, No tenderness, No discharge, Breasts examined .  No infra nor supraclavicular nodes palpable.  No axillary nodes or masses palpable.  No nipple discharge. Breasts normal throughout.    Gastrointestinal:  Soft, Non-tender, Non-distended, No organomegaly.    Genitourinary:  No costovertebral angle tenderness, Normal genitalia for age and sex, No inguinal tenderness, No urethral discharge, No lesions, IUD string visualized at cervix.         Perineum: Within normal limits.         Groin/ inguinal region: Within normal limits.         Urethra: Within normal limits.          Vagina: Within normal limits.         Labia: Within normal limits.    Lymphatics:  no axillary, no supra or infraclavicular nor inguinal lymphadenopathy palpable.    Musculoskeletal:  Normal range of motion, Normal strength, No deformity, Normal gait.    Integumentary:  Warm, Dry, Pink, Intact, No rash.    Neurologic:  Alert, Oriented, Normal sensory, Normal motor function, Cranial Nerves II-XII are grossly intact.    Psychiatric:  Cooperative, Appropriate mood & affect, Normal judgment, PHQ 9/CAGE questionaire reviewed and discussed with patient,  see score.       Impression and Plan   Diagnosis     Well adult (QTU71-TH Z00.00).     XIOMARA (generalized anxiety disorder) (ZEZ91-DR F41.1).     IUD check up (TUN29-MS Z30.431).     Course:  Well controlled.    Patient Instructions:       Counseled: Patient, Regarding diagnosis, Regarding medications, Verbalized understanding, counseled on health benefits of healthy weight, regular exercise, healthy diet, she has some varicose veins left lower leg, occasionally painful, she will use a compression sock daily and call for vascular referral if not improving.    Orders     Orders (Selected)   Prescriptions  Prescribed  citalopram 20 mg oral tablet: = 1 tab(s), Oral, daily, # 30 tab(s), 0 Refill(s), Type: Maintenance, Pharmacy: [facility] #[id number], 1 tab(s) Oral daily, 61.25, in, 11/15/21 12:03:00 CST, Height Measured, 152.3, lb, 11/15/21 12:03:00 CST, Weight Measured  drospirenone-ethinyl estradiol 3 mg-0.02 mg oral tablet: 1 tab(s), Oral, daily, # 84 tab(s), 0 Refill(s), Type: Maintenance, Pharmacy: [facility] #[id number], 1 tab(s) Oral daily, 61.25, in, 11/15/21 12:03:00 CST, Height Measured, 152.3, lb, 11/15/21 12:03:00 CST, Weight Measured.

## 2022-02-15 NOTE — NURSING NOTE
Comprehensive Intake Entered On:  12/8/2021 9:06 AM CST    Performed On:  12/8/2021 9:03 AM CST by Florida Mark LPN               Summary   Chief Complaint :   here for IUD - Mirena removal/replacement, originally placed 7/29/16   Weight Measured :   150.5 lb(Converted to: 150 lb 8 oz, 68.266 kg)    Height Measured :   61.25 in(Converted to: 5 ft 1 in, 155.57 cm)    Body Mass Index :   28.2 kg/m2 (HI)    Body Surface Area :   1.72 m2   Systolic Blood Pressure :   120 mmHg   Diastolic Blood Pressure :   62 mmHg   Mean Arterial Pressure :   81 mmHg   Peripheral Pulse Rate :   59 bpm (LOW)    BP Site :   Right arm   BP Method :   Manual   Temperature Tympanic :   98.6 DegF(Converted to: 37.0 DegC)    Oxygen Saturation :   99 %   Florida Mark LPN - 12/8/2021 9:03 AM CST   Health Status   Allergies Verified? :   Yes   Medication History Verified? :   Yes   Medical History Verified? :   No   Pre-Visit Planning Status :   Completed   Tobacco Use? :   Former smoker   Florida Mark LPN - 12/8/2021 9:03 AM CST   Meds / Allergies   (As Of: 12/8/2021 9:06:50 AM CST)   Allergies (Active)   No Known Medication Allergies  Estimated Onset Date:   Unspecified ; Created By:   Raven London CMA; Reaction Status:   Active ; Category:   Drug ; Substance:   No Known Medication Allergies ; Type:   Allergy ; Updated By:   Raven London CMA; Reviewed Date:   11/11/2020 2:05 PM CST        Medication List   (As Of: 12/8/2021 9:06:50 AM CST)   Prescription/Discharge Order    drospirenone-ethinyl estradiol  :   drospirenone-ethinyl estradiol ; Status:   Prescribed ; Ordered As Mnemonic:   drospirenone-ethinyl estradiol 3 mg-0.02 mg oral tablet ; Simple Display Line:   1 tab(s), Oral, daily, 84 tab(s), 0 Refill(s) ; Ordering Provider:   Kennedi Jasmine; Catalog Code:   drospirenone-ethinyl estradiol ; Order Dt/Tm:   11/15/2021 12:15:46 PM CST          citalopram  :   citalopram ; Status:   Prescribed ; Ordered As Mnemonic:    citalopram 20 mg oral tablet ; Simple Display Line:   1 tab(s), Oral, daily, 90 tab(s), 3 Refill(s) ; Ordering Provider:   Kennedi Jasmine; Catalog Code:   citalopram ; Order Dt/Tm:   12/1/2021 10:23:45 AM CST            Home Meds    ascorbic acid  :   ascorbic acid ; Status:   Documented ; Ordered As Mnemonic:   Vitamin C ; Simple Display Line:   Oral, daily, 0 Refill(s) ; Catalog Code:   ascorbic acid ; Order Dt/Tm:   11/15/2021 12:04:38 PM CST          levonorgestrel  :   levonorgestrel ; Status:   Documented ; Ordered As Mnemonic:   Mirena 52 mg intrauterine device ; Simple Display Line:   52 mg, 1 EA, intrauteral, once, Lot:  [id number]  Exp:  09/2018, 0 Refill(s) ; Catalog Code:   levonorgestrel ; Order Dt/Tm:   7/29/2016 3:53:30 PM CDT

## 2022-02-15 NOTE — TELEPHONE ENCOUNTER
---------------------  From: Dora Reyes   To: Kennedi Jasmine;     Sent: 10/13/2020 1:08:40 PM CDT  Subject: Scheduling Management     PT NO SHOW FOR PHYSICAL 10.13.20

## 2022-02-15 NOTE — TELEPHONE ENCOUNTER
---------------------  From: Kennedi Jasmine   To: DEBBIE RONAK H    Sent: 9/18/2019 11:40:11 AM CDT  Subject: Patient Message - Results Notification          Brayan Walters,  Your pap smear is normal and the screening test for high risk strains of human papilloma virus is negative.  You should return in one year for your annual physical and we can re evaluate at that time if it is necessary to perform a pap smear or not.  National guideline changes suggest certain patients are allowed to extend the interval between pap smear testing. Please let me know if you have any questions.    Thanks,  PRABHAKAR Jackson

## 2022-02-15 NOTE — NURSING NOTE
CAGE Assessment Entered On:  9/25/2019 2:27 PM CDT    Performed On:  9/13/2019 2:27 PM CDT by Maribell Ruiz               Assessment   Have you ever felt you should cut down on your drinking :   No   Have people annoyed you by criticizing your drinking :   No   Have you ever felt bad or guilty about your drinking :   No   Have you ever taken a drink first thing in the morning to steady your nerves or get rid of a hangover (Eye-opener) :   No   CAGE Score :   0    Maribell Ruiz - 9/25/2019 2:27 PM CDT

## 2022-02-15 NOTE — TELEPHONE ENCOUNTER
Entered by Halle Israel CMA on September 02, 2020 11:56:31 AM CDT  ---------------------  From: Halle Israel CMA   To: [facility] #[id number]    Sent: 9/2/2020 11:56:31 AM CDT  Subject: Medication Management     ** Submitted: **  Order:citalopram (citalopram 20 mg oral tablet)  1 tab(s)  Oral  daily  Qty:  30 tab(s)        Days Supply:  30        Refills:  0          Substitutions Allowed     Route To Pharmacy - [facility] #[id number]    Signed by Halle Israel CMA  9/2/2020 4:56:00 PM [facility]    ** Submitted: **  Complete:citalopram (citalopram 20 mg oral tablet)   Signed by Halle Israel CMA  9/2/2020 4:56:00 PM [facility]    ** Not Approved:  **  citalopram (CITALOPRAM 20MG TABLETS)  TAKE 1 TABLET BY MOUTH DAILY  Qty:  30 tab(s)        Days Supply:  30        Refills:  0          Substitutions Allowed     Route To Pharmacy - [facility] #[id number]   Signed by Halle Israel CMA          pt last seen 1/23 ROM, sinusitis  RTC placed for 9/16 - due for annual visit  refilling x 1month with note to pharm      ------------------------------------------  From: [facility] #[id number]  To: Kennedi Jasmine  Sent: September 2, 2020 9:03:40 AM CDT  Subject: Medication Management  Due: September 1, 2020 8:07:28 PM CDT     ** On Hold Pending Signature **     Dispensed Drug: citalopram (citalopram 20 mg oral tablet), TAKE 1 TABLET BY MOUTH DAILY  Quantity: 30 tab(s)  Days Supply: 30  Refills: 0  Substitutions Allowed  Notes from Pharmacy:  ------------------------------------------

## 2022-02-15 NOTE — LETTER
(Inserted Image. Unable to display)     October 16, 2020      RONAK LEWIS  [address], [place] [id number]          Dear RONAK,      Thank you for selecting [facility] (previously [facility]) for your healthcare needs.      Our records indicate you are due for the following services:     Annual Physical and Gynecologic Exam ~ Yearly wellness exams are important for your ongoing health and wellness.  This exam gives you the opportunity to meet with your health care provider to review your health, update immunizations and to recommend preventive screenings that you may be due for.  At your wellness visit, your Healthcare Provider will determine the need for a gynecologic exam and/or Pap smear.      To schedule an appointment or if you have further questions, please contact your primary clinic:   [facility]       [phone]   [facility]       [phone]              [facility]     [phone]      Powered by [employer] and [employer]    Sincerely,    PRABHAKAR Mix

## 2022-02-15 NOTE — NURSING NOTE
Depression Screening Entered On:  11/12/2020 9:15 AM CST    Performed On:  11/11/2020 9:15 AM CST by Shivani Goodman MA               Depression Screening   Little Interest - Pleasure in Activities :   Not at all   Feeling Down, Depressed, Hopeless :   Several days   Initial Depression Screen Score :   1 Score   Poor Appetite or Overeating :   Not at all   Trouble Falling or Staying Asleep :   Not at all   Feeling Tired or Little Energy :   Several days   Feeling Bad About Yourself :   Not at all   Trouble Concentrating :   Several days   Moving or Speaking Slowly :   Not at all   Thoughts Better Off Dead or Hurting Self :   Not at all   XIOMARA Difficulty with Work, Home, Others :   Not difficult at all   Detailed Depression Screen Score :   2    Total Depression Screen Score :   3    Shivani Goodman MA - 11/12/2020 9:15 AM CST

## 2022-02-15 NOTE — TELEPHONE ENCOUNTER
Entered by Maria R Rivera CMA on August 05, 2020 4:35:45 PM CDT  ---------------------  From: Maria R Rivera CMA   To: [facility] #[id number]    Sent: 8/5/2020 4:35:45 PM CDT  Subject: Medication Management     ** Submitted: **  Order:drospirenone-ethinyl estradiol (drospirenone-ethinyl estradiol 3 mg-0.02 mg oral tablet)  1 tab(s)  Oral  daily  Qty:  28 tab(s)        Days Supply:  28        Refills:  0          Substitutions Allowed     [email or web address] #[id number]    Signed by Maria R Rivera CMA  8/5/2020 9:35:00 PM [facility]    ** Submitted: **  Complete:drospirenone-ethinyl estradiol (drospirenone-ethinyl estradiol 3 mg-0.02 mg oral tablet)   Signed by Maria R Rivera CMA  8/5/2020 9:35:00 PM [facility]    ** Not Approved:  **  drospirenone-ethinyl estradiol (DROSPIRENONE/ETHY EST 3/0.02MG T 28)  TAKE 1 TABLET BY MOUTH DAILY  Qty:  28 tab(s)        Days Supply:  28        Refills:  0          Substitutions Allowed     [facility] #[id number]   Signed by Maria R Rivera CMA            ------------------------------------------  [facility][id number]  To: Reid Srinivasan PA-C  Sent: August 5, 2020 3:56:07 PM CDT  Subject: Medication Management  Due: July 28, 2020 10:28:08 PM CDT     ** On Hold Pending Signature **     Dispensed Drug: drospirenone-ethinyl estradiol (drospirenone-ethinyl estradiol 3 mg-0.02 mg oral tablet), TAKE 1 TABLET BY MOUTH DAILY  Quantity: 28 tab(s)  Days Supply: 28  Refills: 0  Substitutions Allowed  Notes from Pharmacy:  ------------------------------------------

## 2022-02-15 NOTE — PROGRESS NOTES
Patient:   RONAK LEWIS            MRN: [id number]            FIN: [id number]               Age:   28 years     Sex:  Female     :  1990   Associated Diagnoses:   XIOMARA (generalized anxiety disorder)   Author:   Kennedi Jasmine      Chief Complaint   2018 5:42 PM CDT    follow up/med check-- citalopram        History of Present Illness   concerning symptoms as listed in CC discussed and confirmed with pt  PHQ and CAGE scoring reviewed with pt  she is doing significantly better  Here with both her boys, Cora and Brad ages 5y and 4y.  Has been on citalopram about 3 months and feels like her anxiety is under good control, not perfect but very good  doesn't want to adjust dose  see phq 9 and XIOMARA 7      Review of Systems   All other systems.     Health Status   Allergies:    Allergic Reactions (Selected)  No Known Medication Allergies   Medications:  (Selected)   Prescriptions  Prescribed  citalopram 10 mg oral tablet: 1 tab(s) ( 10 mg ), PO, Daily, # 90 tab(s), 3 Refill(s), Type: Maintenance, Pharmacy: [id number], 1 tab(s) po daily  Documented Medications  Documented  Mirena 52 mg intrauterine device: 1 EA ( 52 mg ), intrauteral, once, Instructions: Lot:  [id number]  Exp:  2018, 0 Refill(s), Type: Maintenance   Problem list:    All Problems  XIOMARA (generalized anxiety disorder) / SNOMED CT 36039922 / Confirmed      Histories   Past Medical History:    No active or resolved past medical history items have been selected or recorded.   Family History:       Procedure history:    No active procedure history items have been selected or recorded.   Social History:        Alcohol Assessment            Current, Wine (5 oz), 1-2 times per week, 1 drinks/episode average.      Tobacco Assessment            Never smoker      Exercise and Physical Activity Assessment            Exercise frequency: 5-6 times/week.  Exercise type: Running.      Sexual Assessment            Sexually active: Yes.  Sexual  orientation: Heterosexual.        Physical Examination   Vital Signs   4/19/2018 5:42 PM CDT Temperature Tympanic 98.5 DegF    Peripheral Pulse Rate 80 bpm    Pulse Site Radial artery    HR Method Manual    Systolic Blood Pressure 118 mmHg    Diastolic Blood Pressure 66 mmHg    Mean Arterial Pressure 83 mmHg    BP Site Right arm    BP Method Manual      Measurements from flowsheet : Measurements   4/19/2018 5:42 PM CDT Height Measured - Standard 61.5 in    Weight Measured - Standard 139.6 lb    BSA 1.66 m2    Body Mass Index 25.95 kg/m2  HI      General:  Alert and oriented, No acute distress, good eye contact, engaging with conversation.    Neck:  Supple.    Respiratory:  Lungs are clear to auscultation, Respirations are non-labored.    Psychiatric:  Cooperative, Appropriate mood & affect, Normal judgment, Non-suicidal.       Impression and Plan   Diagnosis     XIOMARA (generalized anxiety disorder) (VSJ69-MW F41.1).     Patient Instructions:  Lifestyle risk factors.         Limitations: Alcohol consumption.         Counseled: Patient, Regarding diagnosis, Regarding treatment, Regarding medications, Diet, Activity, Verbalized understanding, 10 min in counseling  will continue this dose for 1 year then rtc to re evaluate if tapering of med may be desired  rtc sooner if any worsening.    Orders     Orders (Selected)   Prescriptions  Prescribed  citalopram 10 mg oral tablet: 1 tab(s) ( 10 mg ), PO, Daily, # 90 tab(s), 3 Refill(s), Type: Maintenance, Pharmacy: [id number], 1 tab(s) po daily.

## 2022-02-15 NOTE — PROGRESS NOTES
Patient:   RONAK LEWIS            MRN: [id number]            FIN: [id number]               Age:   27 years     Sex:  Female     :  1990   Associated Diagnoses:   None   Author:   Cameron Aparicio MD      Visit Information      Primary Care Provider (PCP):  Kennedi Jasmine    NPI# [id number]      Referring Provider:  Cameron Aparicio MD    NPI# [id number]      Chief Complaint   2017 10:53 AM CDT   Patient presents with poss spider bite on R inside of ankle x 5 days        History of Present Illness   CC as above and reviewed w  patient   Pt presents with 5 days of worsening itchy lesions on her R medial ankle. No known bites or exposures. Denies frevers, chills, difficulty rbeathing, vomiting. Feels well otherwise.       Review of Systems            Health Status   Allergies:    Allergic Reactions (Selected)  No Known Medication Allergies   Medications:  (Selected)   Prescriptions  Prescribed  triamcinolone 0.1% topical ointment: 1 dorie, top, bid, # 15 gm, 0 Refill(s), Type: Maintenance, Pharmacy: [facility], 1 dorie top bid,x10 day(s)  Documented Medications  Documented  Mirena 52 mg intrauterine device: 1 EA ( 52 mg ), intrauteral, once, Instructions: Lot:  [id number]  Exp:  2018, 0 Refill(s), Type: Maintenance      Histories   Past Medical History:    No active or resolved past medical history items have been selected or recorded.   Family History:       Procedure history:    No active procedure history items have been selected or recorded.   Social History:        Alcohol Assessment            Current      Tobacco Assessment            Never smoker      Exercise and Physical Activity Assessment            Exercise frequency: 5-6 times/week.  Exercise type: Running.      Sexual Assessment            Sexually active: Yes.  Sexual orientation: Heterosexual.        Physical Examination     (Inserted Image. Unable to display)   2017 11.10.00      Vital Signs   2017 10:53 AM  CDT Temperature Tympanic 97.5 DegF  LOW    Peripheral Pulse Rate 74 bpm    Pulse Site Radial artery    HR Method Manual    Systolic Blood Pressure 110 mmHg    Diastolic Blood Pressure 60 mmHg    Mean Arterial Pressure 77 mmHg    BP Site Right arm    BP Method Manual      Measurements from flowsheet : Measurements   7/28/2017 10:53 AM CDT Height Measured - Standard 61.5 in    Weight Measured - Standard 123.2 lb    BSA 1.56 m2    Body Mass Index 22.9 kg/m2         Impression and Plan   Local reaction to bites  - triamcinolone ointment  - cool compresses

## 2022-02-15 NOTE — TELEPHONE ENCOUNTER
Entered by Halle Israel CMA on September 02, 2020 11:58:24 AM CDT  ---------------------  From: Halle Israel CMA   To: [facility] #[id number]    Sent: 9/2/2020 11:58:24 AM CDT  Subject: Medication Management     ** Submitted: **  Order:drospirenone-ethinyl estradiol (drospirenone-ethinyl estradiol 3 mg-0.02 mg oral tablet)  1 tab(s)  Oral  daily  Qty:  28 tab(s)        Days Supply:  28        Refills:  0          Substitutions Allowed     Route To [facility] #[id number]    Signed by Halle Israel CMA  9/2/2020 4:57:00 PM [facility]    ** Submitted: **  Complete:drospirenone-ethinyl estradiol (drospirenone-ethinyl estradiol 3 mg-0.02 mg oral tablet)   Signed by Halle Israel CMA  9/2/2020 4:58:00 PM [facility]    ** Not Approved:  **  drospirenone-ethinyl estradiol (DROSPIRENONE/ETHY EST 3/0.02MG T 28)  TAKE 1 TABLET BY MOUTH DAILY  Qty:  28 tab(s)        Days Supply:  28        Refills:  0          Substitutions Allowed     Route To [facility] #[id number]   Signed by Halle Israel CMA          pt last seen 1/23 ROM, sinusitis  RTC placed for 9/16 - due for annual visit  refilling x 1month with note to pharm      ------------------------------------------  From: [facility] #[id number]  To: Reid Srinivasan PA-C  Sent: September 2, 2020 9:03:41 AM CDT  Subject: Medication Management  Due: September 1, 2020 8:07:28 PM CDT     ** On Hold Pending Signature **     Dispensed Drug: drospirenone-ethinyl estradiol (drospirenone-ethinyl estradiol 3 mg-0.02 mg oral tablet), TAKE 1 TABLET BY MOUTH DAILY  Quantity: 28 tab(s)  Days Supply: 28  Refills: 0  Substitutions Allowed  Notes from Pharmacy:  ------------------------------------------

## 2022-02-15 NOTE — TELEPHONE ENCOUNTER
Entered by Basilia Burnett CMA on February 03, 2020 4:44:17 PM CST  ---------------------  From: Basilia Burnett CMA   To: [facility] #[id number]    Sent: 2/3/2020 4:44:17 PM CST  Subject: Medication Management     ** Submitted: **  Order:drospirenone-ethinyl estradiol (drospirenone-ethinyl estradiol 3 mg-0.02 mg oral tablet)  1 tab(s)  Oral  daily  Qty:  84 tab(s)        Days Supply:  28        Refills:  1          Substitutions Allowed     Route To [facility] #[id number]    Signed by Basilia Burnett CMA  2/3/2020 4:43:00 PM    ** Submitted: **  Complete:drospirenone-ethinyl estradiol (Delaney 3 mg-0.02 mg oral tablet)   Signed by Basilia Burnett CMA  2/3/2020 4:44:00 PM    ** Not Approved:  **  drospirenone-ethinyl estradiol (DROSPIRENONE/ETHY EST 3/0.02MG T 28)  TAKE 1 TABLET BY MOUTH DAILY  Qty:  84 tab(s)        Days Supply:  28        Refills:  0          Substitutions Allowed     Route To [facility] #[id number]   Signed by Basilia Burnett CMA            ------------------------------------------  From: [facility] #[id number]  To: Kennedi Jasmine  Sent: February 2, 2020 9:16:53 AM CST  Subject: Medication Management  Due: February 3, 2020 9:16:53 AM CST    ** On Hold Pending Signature **  Drug: drospirenone-ethinyl estradiol (drospirenone-ethinyl estradiol 3 mg-0.02 mg oral tablet)  TAKE 1 TABLET BY MOUTH DAILY  Quantity: 84 tab(s)  Days Supply: 28  Refills: 0  Substitutions Allowed  Notes from Pharmacy:     Dispensed Drug: drospirenone-ethinyl estradiol (drospirenone-ethinyl estradiol 3 mg-0.02 mg oral tablet)  TAKE 1 TABLET BY MOUTH DAILY  Quantity: 84 tab(s)  Days Supply: 28  Refills: 0  Substitutions Allowed  Notes from Pharmacy:   ------------------------------------------Med Refill      Date of last office visit and reason:  1/13/20 sinusitis      Date of last Med Check / Px:   9/13/19  Date of last labs pertaining to med:  jfef    RTC order  in chart:  Yes ( 9/2020)

## 2022-02-15 NOTE — NURSING NOTE
Comprehensive Intake Entered On:  9/13/2019 11:04 AM CDT    Performed On:  9/13/2019 10:58 AM CDT by Janett Joyce               Summary   Chief Complaint :   Pt here for annual physical   Last Menstrual Period :   8/31/2019 CDT   Menstrual Status :   Menarcheal   Weight Measured :   137.6 lb(Converted to: 137 lb 10 oz, 62.41 kg)    Height Measured :   61.5 in(Converted to: 5 ft 1 in, 156.21 cm)    Body Mass Index :   25.58 kg/m2 (HI)    Body Surface Area :   1.64 m2   Systolic Blood Pressure :   128 mmHg   Diastolic Blood Pressure :   70 mmHg   Mean Arterial Pressure :   89 mmHg   Peripheral Pulse Rate :   72 bpm   BP Site :   Right arm   Pulse Site :   Radial artery   BP Method :   Manual   HR Method :   Manual   Temperature Tympanic :   97.0 DegF(Converted to: 36.1 DegC)  (LOW)    Janett Joyce - 9/13/2019 10:58 AM CDT   Health Status   Allergies Verified? :   Yes   Medication History Verified? :   Yes   Medical History Verified? :   Yes   Pre-Visit Planning Status :   Completed   Tobacco Use? :   Former smoker   Janett Joyce - 9/13/2019 10:58 AM CDT   Consents   Consent for Immunization Exchange :   Consent Granted   Consent for Immunizations to Providers :   Consent Granted   Janett Joyce - 9/13/2019 10:58 AM CDT   Meds / Allergies   (As Of: 9/13/2019 11:04:11 AM CDT)   Allergies (Active)   No Known Medication Allergies  Estimated Onset Date:   Unspecified ; Created By:   Raven London CMA; Reaction Status:   Active ; Category:   Drug ; Substance:   No Known Medication Allergies ; Type:   Allergy ; Updated By:   Raven London CMA; Reviewed Date:   9/13/2019 11:03 AM CDT        Medication List   (As Of: 9/13/2019 11:04:11 AM CDT)   Prescription/Discharge Order    citalopram  :   citalopram ; Status:   Prescribed ; Ordered As Mnemonic:   citalopram 10 mg oral tablet ; Simple Display Line:   See Instructions, 1 tab daily but increase to 2 tabs daily each month for 10 days prior to expected  menses, 120 tab(s), 3 Refill(s) ; Ordering Provider:   Kennedi Jasmine; Catalog Code:   citalopram ; Order Dt/Tm:   3/18/2019 1:12:33 PM            Home Meds    levonorgestrel  :   levonorgestrel ; Status:   Documented ; Ordered As Mnemonic:   Mirena 52 mg intrauterine device ; Simple Display Line:   52 mg, 1 EA, intrauteral, once, Lot:  [id number]  Exp:  09/2018, 0 Refill(s) ; Catalog Code:   levonorgestrel ; Order Dt/Tm:   7/29/2016 3:53:30 PM

## 2022-02-15 NOTE — PROGRESS NOTES
Chief Complaint    Cold sx since Maya which have not quite resolved - still sinus pressure/drainage, R ear feels plugged.  History of Present Illness      Patient is a 29-year-old female who comes in with concerns about sinus infection.       Her sons had a cold about 3 weeks ago.       Patient developed cold symptoms 2 weeks ago and at first she had fever, chills, sore throat, congestion and cough.       Her cough has gotten better.       Now she continues to have sinus congestion.  She has sinus pain and pressure in her frontal and maxillary sinus areas right greater than left.  Her right ear has decreased hearing.  Positive tooth pain.       She has tried warm compresses and steaming but that has not helped.       No history of sinus infections.  Review of Systems      Negative except as listed in HPI.  Physical Exam   Vitals & Measurements    T: 98.3   F (Tympanic)  HR: 75(Peripheral)  BP: 118/62  SpO2: 97%     HT: 61.5 in  WT: 142 lb  BMI: 26.39       Vitals noted and within normal limits.      Patient is alert, oriented and in no acute distress.      Eyes: conjunctiva not injected.      Ears: canals patent, TMs intact, Right TM with erythema and exudative fluid behind TM, Left TM with no erythema and no bulging      Nose: nasal mucosa is swollen.      Mouth: mucous membranes pink and moist, pharynx not erythematous      Neck is supple with no lymphadenopathy      Heart: regular rate and rhythm with no murmur      Lungs: clear to auscultation bilaterally  Assessment/Plan       Right otitis media (H66.91)         Ordered:          amoxicillin-clavulanate, = 1 tab(s), Oral, q12 hrs, x 10 day(s), # 20 tab(s), 0 Refill(s), Type: Acute, Pharmacy: [id number], 1 tab(s) Oral q12 hrs,x10 day(s), (Ordered)                Sinusitis (J32.9)         meds as ordered        tylenol and ibuprofen prn        Follow up if not improving as anticipated.          Ordered:          amoxicillin-clavulanate, = 1  tab(s), Oral, q12 hrs, x 10 day(s), # 20 tab(s), 0 Refill(s), Type: Acute, Pharmacy: [facility] #[id number], 1 tab(s) Oral q12 hrs,x10 day(s), (Ordered)          fluticasone nasal, = 2 spray(s), Nasal, daily, # 1 bottle(s), 0 Refill(s), Type: Maintenance, Pharmacy: [facility] #[id number], 2 spray(s) Nasal daily, (Ordered)           Patient Information     Name:RONAK LEWIS      Address:      [address] [place] [id number]     Sex:Female     YOB: 1990     [phone]     Emergency Contact:ASHLEIGH SIMEON     MRN:[id number]     FIN:[id number]     Location:[facility]     Date of Service:01/13/2020      Primary Care Physician:       Kennedi Jasmine, [phone]      Attending Physician:       Elsa Amaya MD, [phone]  Problem List/Past Medical History    Ongoing     Anxiety     XIOMARA (generalized anxiety disorder)    Historical     No qualifying data  Medications    Augmentin 875 mg-125 mg oral tablet, 1 tab(s), Oral, q12 hrs    citalopram 20 mg oral tablet, 20 mg= 1 tab(s), Oral, daily, 1 refills    Flonase 50 mcg/inh nasal spray, 2 spray(s), Nasal, daily    Mirena 52 mg intrauterine device, 52 mg= 1 EA, Intrauteral, once    Delaney 3 mg-0.02 mg oral tablet, 1 tab(s), Oral, daily, 1 refills  Allergies    No Known Medication Allergies  Social History    Smoking Status - 09/13/2019     Former smoker     Alcohol      Current, Wine (5 oz), 1-2 times per month, 2 drinks/episode average. Ready to change: No., 09/25/2019     Employment/School      Employed, Work/School description: . Highest education level: MRA ., 09/25/2019     Exercise      Exercise frequency: 5-6 times/week. Exercise type: Running, Walking, Yoga., 09/25/2019     Home/Environment      Marital status: . 2 children. Living situation: Home/Independent. Injuries/Abuse/Neglect in household: No. Feels unsafe at home: No. Family/Friends available for support: Yes.,  09/25/2019     Nutrition/Health      Type of diet: Regular. Wants to lose weight: Yes. Sleeping concerns: No. Feels highly stressed: Yes., 09/25/2019     Sexual      Sexually active: Yes. Identifies as female, Sexual orientation: Straight or heterosexual. History of STD: No. Uses condoms: No. Contraceptive Use Details: Intrauterine device., 09/25/2019      Sexually active: Yes. Sexual orientation: Heterosexual., 08/01/2016     Substance Abuse      Never, 09/25/2019     Tobacco      Former smoker, quit more than 30 days ago, 09/25/2019      Never smoker, 08/01/2016  Family History    Alzheimer's disease: Great Grandmother (M).    Depression: Mother and Grandparent.    High blood pressure: Mother and Grandfather (M).    High cholesterol: Mother.    Migraine: Mother and Grandfather (M).    Father: History is negative    Sister: History is negative    Sister: History is negative    Son: History is negative    Son: History is negative  Immunizations      Vaccine Date Status          influenza virus vaccine, inactivated 09/13/2019 Given          MMR (measles/mumps/rubella) 03/15/2014 Recorded          tetanus/diphth/pertuss (Tdap) adult/adol 08/28/2012 Recorded          hepatitis B pediatric vaccine 03/24/2000 Recorded          hepatitis B pediatric vaccine 10/27/1999 Recorded          hepatitis B pediatric vaccine 09/22/1999 Recorded

## 2022-02-15 NOTE — PROCEDURES
Accession Number:       [id number]  CLINICAL INFORMATION::     None given  LMP::     8/31/19  PREV. PAP::     7/27/16-WNL  PREV. BX::     N/A  SOURCE::     Cervix, Endocervix  STATEMENT OF ADEQUACY::     Satisfactory for evaluation. Endocervical/transformation zone component present.  INTERPRETATION/RESULT::     Negative for intraepithelial lesion or malignancy.  COMMENT::     This Pap test has been evaluated with computer assisted technology.  CYTOTECHNOLOGIST::     SHIRA KAPLAN(ASCP) CT Screening location: [place] [address]  COMMENT:     See comment       EXPLANATORY NOTE:         The Pap is a screening test for cervical cancer. It is       not a diagnostic test and is subject to false negative       and false positive results. It is most reliable when a       satisfactory sample, regularly obtained, is submitted       with relevant clinical findings and history, and when       the Pap result is evaluated along with historic and       current clinical information.  HPV mRNA E6/E7:     Not Detected       This test was performed using the APTIMA HPV Assay ([employer]       This assay detects E6/E7 viral messenger RNA (mRNA) from 14       high-risk HPV types (16,18,31,33,35,39,45,51,52,56,58,59,66,68).         The analytical performance characteristics of       this assay have been determined by [employer]. The modifications have not been       cleared or approved by the FDA. This assay has       been validated pursuant to the CLIA regulations       and is used for clinical purposes.

## 2022-02-15 NOTE — TELEPHONE ENCOUNTER
Entered by Court Bird CMA on February 24, 2020 2:25:43 PM CST  ---------------------  From: Court Bird CMA   To: [facility] #[id number]    Sent: 2/24/2020 2:25:43 PM CST  Subject: Medication Management     ** Submitted: **  Order:citalopram (citalopram 20 mg oral tablet)  1 tab(s)  Oral  daily  Qty:  90 tab(s)        Days Supply:  30        Refills:  1          Substitutions Allowed     Route To Pharmacy - [facility] #[id number]    Signed by Court Bird CMA  2/24/2020 2:25:00 PM    ** Submitted: **  Complete:citalopram (citalopram 20 mg oral tablet)   Signed by Court Bird CMA  2/24/2020 2:25:00 PM    ** Not Approved:  **  citalopram (CITALOPRAM 20MG TABLETS)  TAKE 1 TABLET BY MOUTH DAILY  Qty:  90 tab(s)        Days Supply:  30        Refills:  0          Substitutions Allowed     Route To Pharmacy - [facility] #[id number]   Signed by Court Bird CMA            ------------------------------------------  From: [facility] #[id number]  To: Kennedi Jasmine  Sent: February 24, 2020 8:17:19 AM CST  Subject: Medication Management  Due: February 25, 2020 8:17:19 AM CST    ** On Hold Pending Signature **  Drug: citalopram (citalopram 20 mg oral tablet)  TAKE 1 TABLET BY MOUTH DAILY  Quantity: 90 tab(s)  Days Supply: 30  Refills: 0  Substitutions Allowed  Notes from Pharmacy:     Dispensed Drug: citalopram (citalopram 20 mg oral tablet)  TAKE 1 TABLET BY MOUTH DAILY  Quantity: 90 tab(s)  Days Supply: 30  Refills: 0  Substitutions Allowed  Notes from Pharmacy:   ------------------------------------------Med Refill      Date of last office visit and reason:  1/13/20; right OM, sinusitis      Date of last Med Check / Px:   9/13/19; px  Date of last labs pertaining to med:  9/13/19    RTC order in chart:  yes; September    For Protocol refill, has patient been contacted:  n/a

## 2022-02-15 NOTE — TELEPHONE ENCOUNTER
Entered by Raven London CMA on April 01, 2019 3:34:01 PM CDT  ---------------------  From: Raven London CMA   To: [id number]    Sent: 4/1/2019 3:34:01 PM CDT  Subject: Medication Management     ** Not Approved: Patient has requested refill too soon, filled on 3/18/19 #120 refills 3 **  citalopram (CITALOPRAM 10MG TABLETS)  TAKE 1 TABLET BY MOUTH DAILY  Qty:  90 tab(s)        Days Supply:  90        Refills:  0          Substitutions Allowed     Route To Pharmacy - [id number]   Signed by Raven London CMA             Date of last office visit and reason: 3/18/19 anxiety with [facility]    Date of last Med Check / Px:  3/18/19 anxiety med check with [facility]  Date of last labs pertaining to med:  _    Note: will deny medication, was filled on   3/18/19 for #120 refills 3    RTC order in chart:  yes pt due in 3-6 months for px/pap per   3/18/19 office note from [facility]    For protocol refill, has pt been contacted:  _      ------------------------------------------  From: [id number]  To: Kennedi Jasmine  Sent: March 30, 2019 2:49:08 PM CDT  Subject: Medication Management  Due: March 31, 2019 2:49:08 PM CDT    ** On Hold Pending Signature **  Drug: citalopram (citalopram 10 mg oral tablet)  1 TAB(S) PO DAILY  Quantity: 90 tab(s)     Days Supply: 0         Refills: 2  Substitutions Allowed  Notes from Pharmacy:     Dispensed Drug: citalopram (citalopram 10 mg oral tablet)  TAKE 1 TABLET BY MOUTH DAILY  Quantity: 90 tab(s)     Days Supply: 90        Refills: 0  Substitutions Allowed  Notes from Pharmacy:   ------------------------------------------

## 2022-02-15 NOTE — PROGRESS NOTES
Patient:   RONAK LEWIS            MRN: [id number]            FIN: [id number]               Age:   27 years     Sex:  Female     :  1990   Associated Diagnoses:   XIOMARA (generalized anxiety disorder)   Author:   Kennedi Jasmine      Chief Complaint   2017 10:25 AM CDT   Anxiety        History of Present Illness   Concerning symptoms as listed in Chief Complaint above discussed and confirmed with patient  She is here to talk about anxiety, she has used counseling in the past but never medication.  She has feeling of overwhelmed and isolated, can't turn off her head. 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  FH of grandmother maybe on medication for this  She has tried exercise and counseling and relaxation techniques, healthy eating  SHe is happy with Mirena, sometimes  anxiety flares pre menses, still getting them monthly  She has mild panic attacks as well but can cope with those           Review of Systems   PHQ 9 and CAGE questionaire are reviewed and discussed with patient, see scoring      Health Status   Allergies:    Allergic Reactions (Selected)  No Known Medication Allergies   Medications:  (Selected)   Documented Medications  Documented  Mirena 52 mg intrauterine device: 1 EA ( 52 mg ), intrauteral, once, Instructions: Lot:  [id number]  Exp:  2018, 0 Refill(s), Type: Maintenance   Problem list:    No problem items selected or recorded.      Histories   Past Medical History:    No active or resolved past medical history items have been selected or recorded.   Family History:       Procedure history:    No active procedure history items have been selected or recorded.   Social History:        Alcohol Assessment            Current      Tobacco Assessment            Never smoker      Exercise and Physical Activity Assessment            Exercise  frequency: 5-6 times/week.  Exercise type: Running.      Sexual Assessment            Sexually active: Yes.  Sexual orientation: Heterosexual.        Physical Examination   Vital Signs   8/29/2017 10:25 AM CDT Temperature Tympanic 98.9 DegF    Peripheral Pulse Rate 62 bpm    HR Method Manual    Systolic Blood Pressure 124 mmHg    Diastolic Blood Pressure 68 mmHg    Mean Arterial Pressure 87 mmHg    BP Site Right arm    BP Method Manual      Measurements from flowsheet : Measurements   8/29/2017 10:25 AM CDT Height Measured - Standard 61.5 in    Weight Measured - Standard 126 lb    BSA 1.57 m2    Body Mass Index 23.42 kg/m2      General:  Alert and oriented, No acute distress, good eye contact, engaging with conversation.    Psychiatric:  Cooperative, Appropriate mood & affect, Normal judgment, Non-suicidal.       Impression and Plan   Diagnosis     XIOMARA (generalized anxiety disorder) ([id number] F41.1).     Patient Instructions:  Lifestyle risk factors.         Limitations: Alcohol consumption.         Counseled: Patient, Regarding diagnosis, Regarding treatment, Regarding medications, Diet, Activity, Verbalized understanding, counseled fro 15 min regarding the use/risk/benefits of antidepression/anxiety medication including the black box warning, counseled regarding the importance of psychotherapeutic  counseling (has/given resources), counseled regarding signs of worsening that would warrant immediate return to clinic, or ER or call to campus security/suicide hot line.  Counseled regarding healthy sleep pattern and importance of diet and exercise.  Pt expresses understanding  will work to decrease caffiene  see me or call in 3-6 weeks to continue please.    Orders     Orders (Selected)   Prescriptions  Prescribed  sertraline 50 mg oral tablet: 1 tab(s) ( 50 mg ), PO, Daily, # 30 tab(s), 1 Refill(s), Type: Maintenance, Pharmacy: [id number], 1 tab(s) po daily.

## 2022-02-15 NOTE — TELEPHONE ENCOUNTER
Entered by Cassandra Adamson LPN on December 01, 2021 10:24:02 AM CST  ---------------------  From: Cassandra Adamson LPN   To: [facility] #[id number]    Sent: 12/1/2021 10:24:02 AM CST  Subject: Medication Management     ** Submitted: **  Order:citalopram (citalopram 20 mg oral tablet)  1 tab(s)  Oral  daily  Qty:  90 tab(s)        Refills:  3          Substitutions Allowed     Route To [facility] #[id number]    Signed by Cassandra Adamson LPN  12/1/2021 4:23:00 PM [facility]    ** Submitted: **  Complete:citalopram (citalopram 20 mg oral tablet)   Signed by Cassandra Adamson LPN  12/1/2021 4:24:00 PM [facility]    ** Not Approved:  **  citalopram (CITALOPRAM 20MG TABLETS)  TAKE 1 TABLET BY MOUTH DAILY  Qty:  30 tab(s)        Days Supply:  30        Refills:  0          Substitutions Allowed     Route To [facility] #[id number]   Signed by Cassandra Adamson LPN            ------------------------------------------  From: [facility] #[id number]  To: Kennedi Jasmine  Sent: November 25, 2021 3:44:36 AM CST  Subject: Medication Management  Due: November 16, 2021 3:34:48 PM CST     ** On Hold Pending Signature **     Dispensed Drug: citalopram (citalopram 20 mg oral tablet), TAKE 1 TABLET BY MOUTH DAILY  Quantity: 30 tab(s)  Days Supply: 30  Refills: 0  Substitutions Allowed  Notes from Pharmacy:  ------------------------------------------Pt had physical on 11/15/21. RTC placed for 1 year. Rx sent

## 2022-02-15 NOTE — NURSING NOTE
Comprehensive Intake Entered On:  11/15/2021 12:06 PM CST    Performed On:  11/15/2021 12:03 PM CST by Florida Mark LPN               Summary   Chief Complaint :   1) annual exam 2) check IUD, placed 7/29/16, not having any issues   Weight Measured :   152.3 lb(Converted to: 152 lb 5 oz, 69.082 kg)    Height Measured :   61.25 in(Converted to: 5 ft 1 in, 155.57 cm)    Body Mass Index :   28.54 kg/m2 (HI)    Body Surface Area :   1.73 m2   Systolic Blood Pressure :   112 mmHg   Diastolic Blood Pressure :   72 mmHg   Mean Arterial Pressure :   85 mmHg   Peripheral Pulse Rate :   72 bpm   BP Site :   Right arm   BP Method :   Manual   Temperature Tympanic :   97.9 DegF(Converted to: 36.6 DegC)    Oxygen Saturation :   98 %   Florida Mark LPN - 11/15/2021 12:03 PM CST   Health Status   Allergies Verified? :   Yes   Medication History Verified? :   Yes   Medical History Verified? :   No   Pre-Visit Planning Status :   Completed   Tobacco Use? :   Former smoker   Florida Mark LPN - 11/15/2021 12:03 PM CST   Meds / Allergies   (As Of: 11/15/2021 12:06:53 PM CST)   Allergies (Active)   No Known Medication Allergies  Estimated Onset Date:   Unspecified ; Created By:   Raven London CMA; Reaction Status:   Active ; Category:   Drug ; Substance:   No Known Medication Allergies ; Type:   Allergy ; Updated By:   Raven London CMA; Reviewed Date:   11/11/2020 2:05 PM CST        Medication List   (As Of: 11/15/2021 12:06:53 PM CST)   Prescription/Discharge Order    drospirenone-ethinyl estradiol  :   drospirenone-ethinyl estradiol ; Status:   Prescribed ; Ordered As Mnemonic:   drospirenone-ethinyl estradiol 3 mg-0.02 mg oral tablet ; Simple Display Line:   1 tab(s), Oral, daily, 84 tab(s), 3 Refill(s) ; Ordering Provider:   Kennedi Jasmine; Catalog Code:   drospirenone-ethinyl estradiol ; Order Dt/Tm:   11/11/2020 2:22:17 PM CST          citalopram  :   citalopram ; Status:   Prescribed ; Ordered As  Mnemonic:   citalopram 20 mg oral tablet ; Simple Display Line:   1 tab(s), Oral, daily, 30 tab(s), 0 Refill(s) ; Ordering Provider:   Kennedi Jasmine; Catalog Code:   citalopram ; Order Dt/Tm:   10/27/2021 7:41:18 AM CDT            Home Meds    ascorbic acid  :   ascorbic acid ; Status:   Documented ; Ordered As Mnemonic:   Vitamin C ; Simple Display Line:   Oral, daily, 0 Refill(s) ; Catalog Code:   ascorbic acid ; Order Dt/Tm:   11/15/2021 12:04:38 PM CST          levonorgestrel  :   levonorgestrel ; Status:   Documented ; Ordered As Mnemonic:   Mirena 52 mg intrauterine device ; Simple Display Line:   52 mg, 1 EA, intrauteral, once, Lot:  [id number]  Exp:  09/2018, 0 Refill(s) ; Catalog Code:   levonorgestrel ; Order Dt/Tm:   7/29/2016 3:53:30 PM CDT            Social History   Social History   (As Of: 11/15/2021 12:06:53 PM CST)   Alcohol:        Current, Wine (5 oz), 1-2 times per month, 2 drinks/episode average.  Ready to change: No.   (Last Updated: 9/25/2019 2:24:28 PM CDT by Maribell Ruiz)          Tobacco:  Denies Tobacco Use      Former smoker, quit more than 30 days ago   (Last Updated: 11/15/2021 12:04:56 PM CST by Florida Mark LPN)          Electronic Cigarette/Vaping:  Denies Electronic Cigarette Use      Electronic Cigarette Use: Never.   (Last Updated: 11/15/2021 12:05:01 PM CST by Florida Mark LPN)          Substance Abuse:        Never   (Last Updated: 9/25/2019 2:24:10 PM CDT by Maribell Ruiz)          Employment/School:        Employed, Work/School description: .  Highest education level: MRA .   (Last Updated: 9/25/2019 2:25:17 PM CDT by Maribell Ruiz)          Home/Environment:        Marital status: .  2 children.  Living situation: Home/Independent.  Injuries/Abuse/Neglect in household: No.  Feels unsafe at home: No.  Family/Friends available for support: Yes.   (Last Updated: 9/25/2019 2:26:34 PM CDT by Maribell Ruiz)          Nutrition/Health:        Type of  diet: Regular.  Wants to lose weight: Yes.  Sleeping concerns: No.  Feels highly stressed: Yes.   (Last Updated: 9/25/2019 2:26:16 PM CDT by Maribell Ruiz)          Exercise:        Exercise frequency: 5-6 times/week.  Exercise type: Running, Walking, Yoga.   (Last Updated: 9/25/2019 2:26:26 PM CDT by Maribell Ruiz)          Sexual:        Sexually active: Yes.  Sexual orientation: Heterosexual.   (Last Updated: 8/1/2016 7:12:57 PM CDT by Raven London CMA)   Sexually active: Yes.  Identifies as female, Sexual orientation: Straight or heterosexual.  History of STD: No.  Uses condoms: No.  Contraceptive Use Details: Intrauterine device.   (Last Updated: 9/25/2019 2:26:53 PM CDT by Maribell Ruiz)

## 2022-02-15 NOTE — TELEPHONE ENCOUNTER
Entered by Court Bird CMA on October 27, 2021 7:41:26 AM CDT  ---------------------  From: Court Bird CMA   To: [facility] #[id number]    Sent: 10/27/2021 7:41:26 AM CDT  Subject: Medication Management     ** Submitted: **  Order:citalopram (citalopram 20 mg oral tablet)  1 tab(s)  Oral  daily  Qty:  30 tab(s)        Refills:  0          Substitutions Allowed     Route To [facility] #[id number]    Signed by Court Bird CMA  10/27/2021 12:41:00 PM [facility] [place]    ** Submitted: **  Complete:citalopram (citalopram 20 mg oral tablet)   Signed by Court Bird CMA  10/27/2021 12:41:00 PM [facility] [place]    ** Not Approved:  **  citalopram (CITALOPRAM 20MG TABLETS)  TAKE 1 TABLET BY MOUTH DAILY  Qty:  90 tab(s)        Days Supply:  90        Refills:  0          Substitutions Allowed     Route To [facility] #[id number]   Signed by Court Bird CMA            ------------------------------------------  From: [facility] #[id number]  To: Kennedi Jasmine  Sent: October 25, 2021 8:49:42 AM CDT  Subject: Medication Management  Due: October 21, 2021 8:18:04 PM CDT     ** On Hold Pending Signature **     Dispensed Drug: citalopram (citalopram 20 mg oral tablet), TAKE 1 TABLET BY MOUTH DAILY  Quantity: 90 tab(s)  Days Supply: 90  Refills: 0  Substitutions Allowed  Notes from Pharmacy:  ------------------------------------------11/15/21 appt scheduled

## 2022-02-15 NOTE — NURSING NOTE
Comprehensive Intake Entered On:  3/18/2019 12:52 PM CDT    Performed On:  3/18/2019 12:49 PM CDT by Katiana Bansal CMA               Summary   Chief Complaint :   med check    Menstrual Status :   Menarcheal   Weight Measured :   141.4 lb(Converted to: 141 lb 6 oz, 64.14 kg)    Systolic Blood Pressure :   106 mmHg   Diastolic Blood Pressure :   66 mmHg   Mean Arterial Pressure :   79 mmHg   Peripheral Pulse Rate :   63 bpm   BP Site :   Right arm   BP Method :   Manual   HR Method :   Electronic   Temperature Tympanic :   97.4 DegF(Converted to: 36.3 DegC)  (LOW)    Oxygen Saturation :   99 %   Katiana Bansal CMA - 3/18/2019 12:49 PM CDT   Health Status   Allergies Verified? :   Yes   Medication History Verified? :   Yes   Pre-Visit Planning Status :   Completed   Tobacco Use? :   Former smoker   Katiana Bansal CMA - 3/18/2019 12:49 PM CDT   Consents   Consent for Immunization Exchange :   Consent Granted   Consent for Immunizations to Providers :   Consent Granted   Katiana Bansal CMA - 3/18/2019 12:49 PM CDT   Meds / Allergies   (As Of: 3/18/2019 12:52:47 PM CDT)   Allergies (Active)   No Known Medication Allergies  Estimated Onset Date:   Unspecified ; Created By:   Raven London CMA; Reaction Status:   Active ; Category:   Drug ; Substance:   No Known Medication Allergies ; Type:   Allergy ; Updated By:   Raven London CMA; Reviewed Date:   4/19/2018 5:46 PM CDT        Medication List   (As Of: 3/18/2019 12:52:47 PM CDT)   Prescription/Discharge Order    citalopram  :   citalopram ; Status:   Prescribed ; Ordered As Mnemonic:   citalopram 10 mg oral tablet ; Simple Display Line:   10 mg, 1 tab(s), PO, Daily, 90 tab(s), 3 Refill(s) ; Ordering Provider:   Kennedi Jasmine; Catalog Code:   citalopram ; Order Dt/Tm:   4/19/2018 5:53:42 PM            Home Meds    levonorgestrel  :   levonorgestrel ; Status:   Documented ; Ordered As Mnemonic:   Mirena 52 mg intrauterine device ; Simple Display Line:   52 mg, 1 EA,  intrauteral, once, Lot:  [id number]  Exp:  09/2018, 0 Refill(s) ; Catalog Code:   levonorgestrel ; Order Dt/Tm:   7/29/2016 3:53:30 PM

## 2022-02-15 NOTE — TELEPHONE ENCOUNTER
Entered by Cassandra Adamson LPN on June 26, 2020 9:29:58 AM CDT  ---------------------  From: Cassandra Adamson LPN   To: [facility] #[id number]    Sent: 6/26/2020 9:29:58 AM CDT  Subject: Medication Management     ** Not Approved: Patient has requested refill too soon, Rx sent 2/3/20 #90 with 1 refill **  drospirenone-ethinyl estradiol (DROSPIRENONE/ETHY EST 3/0.02MG T 28)  TAKE 1 TABLET BY MOUTH DAILY  Qty:  84 tab(s)        Days Supply:  84        Refills:  0          Substitutions Allowed     Route To Pharmacy - [facility] #[id number]   Signed by Cassandra Adamson LPN            ------------------------------------------  From: [facility] #[id number]  To: Kennedi Jasmine  Sent: June 25, 2020 10:08:25 AM CDT  Subject: Medication Management  Due: June 24, 2020 10:12:23 PM CDT     ** On Hold Pending Signature **     Dispensed Drug: drospirenone-ethinyl estradiol (drospirenone-ethinyl estradiol 3 mg-0.02 mg oral tablet), TAKE 1 TABLET BY MOUTH DAILY  Quantity: 84 tab(s)  Days Supply: 84  Refills: 0  Substitutions Allowed  Notes from Pharmacy:  ------------------------------------------

## 2022-02-15 NOTE — PROGRESS NOTES
Patient:   RONAK LEWIS            MRN: [id number]            FIN: [id number]               Age:   31 years     Sex:  Female     :  1990   Associated Diagnoses:   Encounter for IUD removal and reinsertion   Author:   Kennedi Jasmine      Procedure   Pap smear procedure   Date/ Time:  2021 9:24:00 AM.     Confirmed: patient, procedure, safety procedures followed.     Performed by: Kennedi Jasmine.     Informed consent: signed by patient.     Indication: Would like To proceed with IUD Replacement. She has had her first Mirena a little over 5 years, happy with minimal bleeding and great contraception. Would like it repalced today.Reviewed with pt today the risks and benefits of procedure. Risks include infection, uterine perforation, expulsion , embedment in the myometrium and discomfort, .  Common side effects include vaginal bleeding alterations, amenorrhea, intermenstrual bleeding and spotting, abdominal/pelvic discomfort.      With the patient in the lithotomy position, her external genitalia is normal. A bimanual exam is performed to locate the position and size of the uterus , it is deep and to her right. there are no pelvic contraindications.   A sterile speculum is used to enter the vaginal canal.  The os is visualized. THE IUD STRING IS VISIBLE< WITH STEADY TENSION USING RING FORCEPS< THE IUD IS REMOVED FULLY INTACT>  Three sterile Betadine soaked gauze are used to clean the os thoroughly.  The tenaculum is slowly applied near the lip of the cervix  and gentle traction is applied to align the cervical canal with the uterine cavity.  A sterile uterine sound is inserted to check the patency of the cervix, measure the depth of the uterine cavity, confirm its direction and exclude the presence of any uterine anomaly.  The uterus sounded to ______7.5 cm_________.     The MIRENA   IUD package was open,and the device is loaded into the insertion tube.   The tenaculum is grasped with one hand and  with the other hand the insertion tube is gently advance through the cervical canal according to insertion guidelines.   The insertion tube was removed and threads were cut perpendicular with a sterile scissors, leaving about 3 cm visible outside the cervix.   Instruments removed.    The patient tolerated the procedure well and had no acute pain. She sat up and was monitored and given the the patient Follow-up Reminder Card  and was asked to make an appointment in 4-12 weeks to be re evaluated. Reminded regarding bleeding pattern expectations, need for condoms if not in a monogamous relationship and the need for yearly  exams.  .     Procedure tolerated: well.     Complications: none.        Impression and Plan   Diagnosis     Encounter for IUD removal and reinsertion ([id number] Z30.433).

## 2022-03-02 VITALS
SYSTOLIC BLOOD PRESSURE: 124 MMHG | HEART RATE: 56 BPM | HEIGHT: 61 IN | TEMPERATURE: 98.2 F | DIASTOLIC BLOOD PRESSURE: 68 MMHG | BODY MASS INDEX: 28.21 KG/M2 | OXYGEN SATURATION: 99 %

## 2022-03-02 NOTE — NURSING NOTE
Comprehensive Intake Entered On:  1/31/2022 9:25 AM CST    Performed On:  1/31/2022 9:23 AM CST by Florida Mark LPN               Summary   Chief Complaint :   here for IUD check, Mirena placed 12/08/2021   Height Measured :   61.25 in(Converted to: 5 ft 1 in, 155.57 cm)    Ht/Wt Measurement Refused by Patient? :   Yes   Systolic Blood Pressure :   124 mmHg   Diastolic Blood Pressure :   68 mmHg   Mean Arterial Pressure :   87 mmHg   Peripheral Pulse Rate :   56 bpm (LOW)    BP Site :   Right arm   BP Method :   Manual   Temperature Tympanic :   98.2 DegF(Converted to: 36.8 DegC)    Oxygen Saturation :   99 %   Florida Mark LPN - 1/31/2022 9:23 AM CST   Health Status   Allergies Verified? :   Yes   Medication History Verified? :   Yes   Medical History Verified? :   No   Pre-Visit Planning Status :   Completed   Tobacco Use? :   Former smoker   Florida Mark LPN - 1/31/2022 9:23 AM CST   Meds / Allergies   (As Of: 1/31/2022 9:25:59 AM CST)   Allergies (Active)   No Known Medication Allergies  Estimated Onset Date:   Unspecified ; Created By:   Raven London CMA; Reaction Status:   Active ; Category:   Drug ; Substance:   No Known Medication Allergies ; Type:   Allergy ; Updated By:   Raven London CMA; Reviewed Date:   11/11/2020 2:05 PM CST        Medication List   (As Of: 1/31/2022 9:25:59 AM CST)   Prescription/Discharge Order    drospirenone-ethinyl estradiol  :   drospirenone-ethinyl estradiol ; Status:   Prescribed ; Ordered As Mnemonic:   drospirenone-ethinyl estradiol 3 mg-0.02 mg oral tablet ; Simple Display Line:   1 tab(s), Oral, daily, 84 tab(s), 0 Refill(s) ; Ordering Provider:   Kennedi Jasmine; Catalog Code:   drospirenone-ethinyl estradiol ; Order Dt/Tm:   11/15/2021 12:15:46 PM CST          citalopram  :   citalopram ; Status:   Prescribed ; Ordered As Mnemonic:   citalopram 20 mg oral tablet ; Simple Display Line:   1 tab(s), Oral, daily, 90 tab(s), 3 Refill(s) ; Ordering  Provider:   Kennedi Jasmine; Catalog Code:   citalopram ; Order Dt/Tm:   12/1/2021 10:23:45 AM CST            Home Meds    ascorbic acid  :   ascorbic acid ; Status:   Documented ; Ordered As Mnemonic:   Vitamin C ; Simple Display Line:   Oral, daily, 0 Refill(s) ; Catalog Code:   ascorbic acid ; Order Dt/Tm:   11/15/2021 12:04:38 PM CST          levonorgestrel  :   levonorgestrel ; Status:   Documented ; Ordered As Mnemonic:   Mirena 52 mg intrauterine device ; Simple Display Line:   52 mg, 1 EA, intrauteral, once, Lot:  [id number]  Exp:  09/2018, 0 Refill(s) ; Catalog Code:   levonorgestrel ; Order Dt/Tm:   7/29/2016 3:53:30 PM CDT

## 2022-03-02 NOTE — PROGRESS NOTES
Patient:   RONAK LEWIS            MRN: [id number]            FIN: [id number]               Age:   31 years     Sex:  Female     :  1990   Associated Diagnoses:   IUD check up; Acne vulgaris   Author:   Kennedi Jasmine      Chief Complaint   2022 9:23 AM CST    here for IUD check, Mirena placed 2021        History of Present Illness   Symptoms and concerns as expressed in CC reviewed and confirmed with patient  Here for IUD check, recently inserted--this is second IUD (mirena)  she has stopped the oc's which she has used for 2-3 years ongoing with last IUD to control acne, acne has been cystic in past  she is very pleased iwth IUD and doesn't want to give up, but frustrated with acne  No bleeding or pain with IC, minimal spotting, minimal cramping  No new partner since insertion  currently cleans face with products but no acne specific topicals used      Health Status   Allergies:    Allergic Reactions (Selected)  No Known Medication Allergies   Medications:  (Selected)   Prescriptions  Prescribed  Clindagel 1% topical gel: See Instructions, Instructions: apply a thin film to face 20 min after washing at hx, # 30 gm, 0 Refill(s), Type: Maintenance, Pharmacy: [facility] #[id number], ok for OTC if rx not covered, apply a thin film to face 20 min after washing at hx, 6...  benzoyl peroxide 10% topical cream: 1 dorie, Topical, daily, Instructions: keep away from eyes and mucous membranes  clean affected area before application and wait 20 min, apply in a.m., # 45 gm, 3 Refill(s), Type: Maintenance, Pharmacy: [facility] #[id number], ok for OTC if rx not...  citalopram 20 mg oral tablet: = 1 tab(s), Oral, daily, # 90 tab(s), 3 Refill(s), Type: Maintenance, Pharmacy: [facility] #[id number], 1 tab(s) Oral daily, 61.25, in, 11/15/21 12:03:00 CST, Height Measured, 152.3, lb, 11/15/21 12:03:00 CST, Weight Measured  drospirenone-ethinyl estradiol 3 mg-0.02 mg oral tablet: 1 tab(s), Oral,  daily, # 84 tab(s), 0 Refill(s), Type: Maintenance, Pharmacy: [facility] #[id number], 1 tab(s) Oral daily, 61.25, in, 11/15/21 12:03:00 CST, Height Measured, 152.3, lb, 11/15/21 12:03:00 CST, Weight Measured  Documented Medications  Documented  Mirena 52 mg intrauterine device: 1 EA ( 52 mg ), intrauteral, once, Instructions: Lot:  [id number]  Exp:  09/2018, 0 Refill(s), Type: Maintenance  Vitamin C: Oral, daily, 0 Refill(s), Type: Maintenance   Problem list:    All Problems  XIOMARA (generalized anxiety disorder) / SNOMED CT 24353179 / Confirmed  Canceled: Anxiety / SNOMED CT 64791487      Histories   Past Medical History:    No active or resolved past medical history items have been selected or recorded.   Family History:    [place] - Lung cancer  Grandfather (P)  High blood pressure  Mother  Grandfather (M)  Migraine  Mother  Grandfather (M)  Hypercholesterolemia  Grandfather (M)  Mother  Depression  Mother  Grandparent  Grandmother (M)  Alzheimer's disease  Great Grandmother (M)  High cholesterol  Mother     Procedure history:    Insertion of intrauterine device (IUD) (CPT-4 27890) performed by Kennedi Jasmine on 12/8/2021 at 31 Years.  Comments:  12/8/2021 12:32 PM Florida Penny LPN  IUD - Mirena     Lot# [id number]  Exp. 01/2024    Due for removal in 6 years - on or before 12/08/2027  Removal of intrauterine device (IUD) (CPT-4 69762) performed by Kennedi Jasmine on 12/8/2021 at 31 Years.  Comments:  12/8/2021 12:33 PM Florida Penny LPN  IUD - Mirena removal    originally placed 07/29/2016   Social History:        Electronic Cigarette/Vaping Assessment: Denies Electronic Cigarette Use            Electronic Cigarette Use: Never.      Alcohol Assessment            Current, Wine (5 oz), 1-2 times per month, 2 drinks/episode average.  Ready to change: No.      Tobacco Assessment: Denies Tobacco Use            Former smoker, quit more than 30 days ago      Substance Abuse Assessment             Never      Employment and Education Assessment            Employed, Work/School description: .  Highest education level: MRA .      Home and Environment Assessment            Marital status: .  2 children.  Living situation: Home/Independent.  Injuries/Abuse/Neglect in               household: No.  Feels unsafe at home: No.  Family/Friends available for support: Yes.      Nutrition and Health Assessment            Type of diet: Regular.  Wants to lose weight: Yes.  Sleeping concerns: No.  Feels highly stressed: Yes.      Exercise and Physical Activity Assessment            Exercise frequency: 5-6 times/week.  Exercise type: Running, Walking, Yoga.      Sexual Assessment            Sexually active: Yes.  Sexual orientation: Heterosexual.            Sexually active: Yes.  Identifies as female, Sexual orientation: Straight or heterosexual.  History of STD:               No.  Uses condoms: No.  Contraceptive Use Details: Intrauterine device.        Physical Examination   Vital Signs   1/31/2022 9:23 AM CST Temperature Tympanic 98.2 DegF    Peripheral Pulse Rate 56 bpm  LOW    Systolic Blood Pressure 124 mmHg    Diastolic Blood Pressure 68 mmHg    Mean Arterial Pressure 87 mmHg    BP Site Right arm    BP Method Manual    Oxygen Saturation 99 %      Measurements from flowsheet : Measurements   1/31/2022 9:23 AM CST Ht/Wt Measurement Refused by Patient? Yes    Height Measured - Standard 61.25 in      General:  Alert and oriented, No acute distress, pelvic exam reveals normal external genitalia  Vault with normal rugae and no discharge  Cervix clear with IUD strings visible at os  face with inflammatory acne, old cystic scar.       Impression and Plan   Diagnosis     IUD check up (YJL70-VS Z30.431).     Acne vulgaris (CPB88-UN L70.0).     IUD check up (ZMA66-RF Z30.431).     Plan:  will start topical acne treatment and refer to derm. Would like to avoid year after year of oc added to her progesterone in  the IUD if possible.    Patient Instructions:       Counseled: Patient, Regarding diagnosis, Regarding treatment, Regarding medications, Verbalized understanding, Answered questions  Reassured regarding placement   .    Orders     Orders (Selected)   Prescriptions  Prescribed  Clindagel 1% topical gel: See Instructions, Instructions: apply a thin film to face 20 min after washing at hx, # 30 gm, 0 Refill(s), Type: Maintenance, Pharmacy: [facility] #[id number], ok for OTC if rx not covered, apply a thin film to face 20 min after washing at hx, 6...  benzoyl peroxide 10% topical cream: 1 dorie, Topical, daily, Instructions: keep away from eyes and mucous membranes  clean affected area before application and wait 20 min, apply in a.m., # 45 gm, 3 Refill(s), Type: Maintenance, Pharmacy: [facility] #[id number], ok for OTC if rx not....

## 2022-03-02 NOTE — NURSING NOTE
Faxed order for derm consult to [facility] at [phone]. Confirmation received.  Dx: acne (L70.0)  Office contact: [phone]

## 2022-03-15 DIAGNOSIS — L70.9 ACNE: Primary | ICD-10-CM

## 2022-03-16 DIAGNOSIS — L70.0 ACNE VULGARIS: Primary | ICD-10-CM

## 2022-03-16 RX ORDER — CLINDAMYCIN PHOSPHATE 10 MG/G
GEL TOPICAL 2 TIMES DAILY
Qty: 30 G | Refills: 0 | Status: SHIPPED | OUTPATIENT
Start: 2022-03-16 | End: 2022-07-14

## 2022-03-16 RX ORDER — CLINDAMYCIN PHOSPHATE 10 MG/G
1 GEL TOPICAL 2 TIMES DAILY
COMMUNITY
Start: 2022-01-31 | End: 2022-03-16

## 2022-03-16 RX ORDER — CLINDAMYCIN PHOSPHATE 10 MG/G
GEL TOPICAL 2 TIMES DAILY
Qty: 30 ML | Refills: 0 | Status: SHIPPED | OUTPATIENT
Start: 2022-03-16 | End: 2023-06-01

## 2022-03-16 NOTE — TELEPHONE ENCOUNTER
Refill request Clindamycin. Please advise on request.  Routing refill request to provider for review/approval because:  Drug not on the [facility] refill protocol   Last Written Prescription Date:  1/31/22  Last Fill Quantity: 30gn,  # refills: 0   Last office visit: Visit date not found with prescribing provider:  1/31/21 IUD, acne NCB

## 2022-09-07 DIAGNOSIS — L70.9 ACNE: ICD-10-CM

## 2022-09-07 RX ORDER — CLINDAMYCIN PHOSPHATE 10 MG/G
GEL TOPICAL
Qty: 30 G | Refills: 1 | Status: SHIPPED | OUTPATIENT
Start: 2022-09-07 | End: 2023-02-14

## 2022-09-07 NOTE — TELEPHONE ENCOUNTER
Prescription approved per [facility] Refill Protocol.    Last office visit 1/31/2022    SAROJ Handley  [facility]

## 2022-09-07 NOTE — LETTER
Brayan Walters,  I sent refills for a month to your pharmacy on the citalopram, please schedule an appointment with me in the next 30 days, I look forward to seeing you!    PRABHAKAR Jackson

## 2022-10-03 ENCOUNTER — HEALTH MAINTENANCE LETTER (OUTPATIENT)
Age: 32
End: 2022-10-03

## 2022-12-28 ENCOUNTER — NURSE TRIAGE (OUTPATIENT)
Dept: NURSING | Facility: CLINIC | Age: 32
End: 2022-12-28

## 2022-12-28 DIAGNOSIS — F41.1 GAD (GENERALIZED ANXIETY DISORDER): Primary | ICD-10-CM

## 2022-12-28 RX ORDER — CITALOPRAM HYDROBROMIDE 20 MG/1
20 TABLET ORAL DAILY
Qty: 30 TABLET | Refills: 0 | Status: SHIPPED | OUTPATIENT
Start: 2022-12-28 | End: 2023-01-18

## 2022-12-28 NOTE — TELEPHONE ENCOUNTER
Takes citalopram, run out of refills. Tried to get more refills and pharmacy said to contact provider. It's for citalopram 20 mg, once a day. Her pharmacy is [facility] in [place], [place]. The medication isn't listed in her chart. Please call her at:  [phone].  May leave a detailed message. She said she is Kennedi Reid, PHYLLIS, patient.  Palma Tereas RN  [facility]    Reason for Disposition    Caller has URGENT medicine question about med that PCP or specialist prescribed and triager unable to answer question    Additional Information    Negative: Intentional drug overdose and suicidal thoughts or ideas    Negative: Drug overdose and triager unable to answer question    Negative: Caller requesting a renewal or refill of a medicine patient is currently taking    Negative: Caller requesting information unrelated to medicine    Negative: Caller requesting information about COVID-19 Vaccine    Negative: Caller requesting information about Emergency Contraception    Negative: Caller requesting information about Combined Birth Control Pills    Negative: Caller requesting information about Progestin Birth Control Pills    Negative: Caller requesting information about Post-Op pain or medicines    Negative: Caller requesting a prescription antibiotic (such as penicillin) for Strep throat and has a positive culture result    Negative: Caller requesting a prescription anti-viral med (such as Tamiflu) and has influenza (flu) symptoms    Negative: Immunization reaction suspected    Negative: Rash while taking a medicine or within 3 days of stopping it    Negative: Asthma and having symptoms of asthma (cough, wheezing, etc.)    Negative: Symptom of illness (e.g., headache, abdominal pain, earache, vomiting) that are more than mild    Negative: Breastfeeding questions about mother's medicines and diet    Negative: MORE THAN A DOUBLE DOSE of a prescription or over-the-counter (OTC) drug    Negative: DOUBLE DOSE (an  extra dose or lesser amount) of prescription drug and any symptoms (e.g., dizziness, nausea, pain, sleepiness)    Negative: DOUBLE DOSE (an extra dose or lesser amount) of over-the-counter (OTC) drug and any symptoms (e.g., dizziness, nausea, pain, sleepiness)    Negative: Took another person's prescription drug    Negative: DOUBLE DOSE (an extra dose or lesser amount) of prescription drug and NO symptoms  (Exception: A double dose of antibiotics.)    Negative: Diabetes drug error or overdose (e.g., took wrong type of insulin or took extra dose)    Negative: Caller has medication question about med NOT prescribed by PCP and triager unable to answer question (e.g., compatibility with other med, storage)    Negative: Prescription not at pharmacy and was prescribed by PCP recently  (Exception: triager has access to EMR and prescription is recorded there. Go to Home Care and confirm for pharmacy.)    Negative: Pharmacy calling with prescription question and triager unable to answer question    Protocols used: MEDICATION QUESTION CALL-A-[place]

## 2023-01-12 ASSESSMENT — ENCOUNTER SYMPTOMS
NAUSEA: 0
PARESTHESIAS: 0
NERVOUS/ANXIOUS: 0
CONSTIPATION: 0
WEAKNESS: 0
JOINT SWELLING: 0
BREAST MASS: 0
PALPITATIONS: 0
ARTHRALGIAS: 0
CHILLS: 0
HEADACHES: 0
SORE THROAT: 0
DYSURIA: 0
COUGH: 0
HEMATOCHEZIA: 0
FEVER: 0
SHORTNESS OF BREATH: 0
FREQUENCY: 0
MYALGIAS: 0
DIZZINESS: 0
HEMATURIA: 0
DIARRHEA: 0
HEARTBURN: 0
EYE PAIN: 0
ABDOMINAL PAIN: 0

## 2023-01-18 ENCOUNTER — OFFICE VISIT (OUTPATIENT)
Dept: FAMILY MEDICINE | Facility: CLINIC | Age: 33
End: 2023-01-18
Payer: COMMERCIAL

## 2023-01-18 VITALS
WEIGHT: 145.2 LBS | SYSTOLIC BLOOD PRESSURE: 124 MMHG | HEIGHT: 62 IN | HEART RATE: 54 BPM | OXYGEN SATURATION: 97 % | DIASTOLIC BLOOD PRESSURE: 62 MMHG | BODY MASS INDEX: 26.72 KG/M2 | TEMPERATURE: 97.9 F

## 2023-01-18 DIAGNOSIS — Z00.00 ROUTINE GENERAL MEDICAL EXAMINATION AT A HEALTH CARE FACILITY: ICD-10-CM

## 2023-01-18 DIAGNOSIS — Z13.220 LIPID SCREENING: ICD-10-CM

## 2023-01-18 DIAGNOSIS — I83.812 VARICOSE VEINS OF LEFT LOWER EXTREMITY WITH PAIN: ICD-10-CM

## 2023-01-18 DIAGNOSIS — F41.1 GENERALIZED ANXIETY DISORDER: Primary | ICD-10-CM

## 2023-01-18 DIAGNOSIS — Z11.59 NEED FOR HEPATITIS C SCREENING TEST: ICD-10-CM

## 2023-01-18 PROBLEM — L70.0 ACNE VULGARIS: Status: ACTIVE | Noted: 2023-01-18

## 2023-01-18 PROCEDURE — 99213 OFFICE O/P EST LOW 20 MIN: CPT | Mod: 25 | Performed by: NURSE PRACTITIONER

## 2023-01-18 PROCEDURE — 99395 PREV VISIT EST AGE 18-39: CPT | Performed by: NURSE PRACTITIONER

## 2023-01-18 RX ORDER — CITALOPRAM HYDROBROMIDE 20 MG/1
20 TABLET ORAL DAILY
Qty: 90 TABLET | Refills: 3 | Status: SHIPPED | OUTPATIENT
Start: 2023-01-18 | End: 2023-10-19

## 2023-01-18 RX ORDER — SPIRONOLACTONE 50 MG/1
TABLET, FILM COATED ORAL
COMMUNITY
Start: 2023-01-06 | End: 2023-06-01

## 2023-01-18 ASSESSMENT — ANXIETY QUESTIONNAIRES
2. NOT BEING ABLE TO STOP OR CONTROL WORRYING: SEVERAL DAYS
4. TROUBLE RELAXING: SEVERAL DAYS
6. BECOMING EASILY ANNOYED OR IRRITABLE: NOT AT ALL
GAD7 TOTAL SCORE: 4
7. FEELING AFRAID AS IF SOMETHING AWFUL MIGHT HAPPEN: NOT AT ALL
7. FEELING AFRAID AS IF SOMETHING AWFUL MIGHT HAPPEN: NOT AT ALL
5. BEING SO RESTLESS THAT IT IS HARD TO SIT STILL: SEVERAL DAYS
3. WORRYING TOO MUCH ABOUT DIFFERENT THINGS: SEVERAL DAYS
1. FEELING NERVOUS, ANXIOUS, OR ON EDGE: NOT AT ALL
8. IF YOU CHECKED OFF ANY PROBLEMS, HOW DIFFICULT HAVE THESE MADE IT FOR YOU TO DO YOUR WORK, TAKE CARE OF THINGS AT HOME, OR GET ALONG WITH OTHER PEOPLE?: NOT DIFFICULT AT ALL
GAD7 TOTAL SCORE: 4
IF YOU CHECKED OFF ANY PROBLEMS ON THIS QUESTIONNAIRE, HOW DIFFICULT HAVE THESE PROBLEMS MADE IT FOR YOU TO DO YOUR WORK, TAKE CARE OF THINGS AT HOME, OR GET ALONG WITH OTHER PEOPLE: NOT DIFFICULT AT ALL

## 2023-01-18 ASSESSMENT — ENCOUNTER SYMPTOMS
DIARRHEA: 0
CONSTIPATION: 0
COUGH: 0
CHILLS: 0
FREQUENCY: 0
MYALGIAS: 0
PALPITATIONS: 0
BREAST MASS: 0
ARTHRALGIAS: 0
EYE PAIN: 0
HEMATOCHEZIA: 0
SORE THROAT: 0
FEVER: 0
SHORTNESS OF BREATH: 0
NAUSEA: 0
HEARTBURN: 0
NERVOUS/ANXIOUS: 1
HEMATURIA: 0
DIZZINESS: 0
WEAKNESS: 0
ABDOMINAL PAIN: 0
JOINT SWELLING: 0
DYSURIA: 0
HEADACHES: 0
PARESTHESIAS: 0

## 2023-01-18 NOTE — PATIENT INSTRUCTIONS
Please fast (10 hours water only) and schedule lab only appt for lipid and Hep C testing.      Preventive Health Recommendations  Female Ages 26 - 39  Yearly exam:   See your health care provider every year in order to  Review health changes.   Discuss preventive care.    Review your medicines if you your doctor has prescribed any.    Until age 30: Get a Pap test every three years (more often if you have had an abnormal result).    After age 30: Talk to your doctor about whether you should have a Pap test every 3 years or have a Pap test with HPV screening every 5 years.   You do not need a Pap test if your uterus was removed (hysterectomy) and you have not had cancer.  You should be tested each year for STDs (sexually transmitted diseases), if you're at risk.   Talk to your provider about how often to have your cholesterol checked.  If you are at risk for diabetes, you should have a diabetes test (fasting glucose).  Shots: Get a flu shot each year. Get a tetanus shot every 10 years.   Nutrition:   Eat at least 5 servings of fruits and vegetables each day.  Eat whole-grain bread, whole-wheat pasta and brown rice instead of white grains and rice.  Get adequate Calcium and Vitamin D.     Lifestyle  Exercise at least 150 minutes a week (30 minutes a day, 5 days of the week). This will help you control your weight and prevent disease.  Limit alcohol to one drink per day.  No smoking.   Wear sunscreen to prevent skin cancer.  See your dentist every six months for an exam and cleaning.

## 2023-01-18 NOTE — PROGRESS NOTES
SUBJECTIVE:   CC: Selena is an 32 year old who presents for preventive health visit.     Doing well, and same partner, Pap is up-to-date.  She has a Mirena in place.  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  She is active as a runner and runs about 4 miles few times a week.  This is helped her lose about 10 pounds we discussed BMI and goals.  She does have some varicose veins in her left leg in the lower leg and down by the foot.  These are painful and throbs sometimes given how active she is.  She has tried support stockings compression stockings with some relief.  She would like to further explore this with a consult with vascular    She is using Celexa for anxiety.  She feels it is very effective and does not want to decrease her dose.  Her XIOMARA-7 score is reviewed.  Med refill sent.    No family history of breast ovarian or uterine cancer.  We will start mammogram screening at age 40    Not interested in influenza vaccine today.  Her COVID-vaccine is up-to-date.  She will consider coming in fasting for lipid screening and hep C screening    Healthy Habits:     Getting at least 3 servings of Calcium per day:  Yes    Bi-annual eye exam:  Yes    Dental care twice a year:  Yes    Sleep apnea or symptoms of sleep apnea:  Daytime drowsiness    Diet:  Regular (no restrictions)    Frequency of exercise:  4-5 days/week    Duration of exercise:  30-45 minutes    Taking medications regularly:  Yes    Medication side effects:  None    PHQ-2 Total Score: 0    Additional concerns today:  No  Anxiety  Pertinent negatives include no abdominal pain, arthralgias, chest pain, chills, congestion, coughing, fever, headaches, joint swelling, myalgias, nausea, rash, sore throat or weakness.         Answers for HPI/ROS submitted by the patient on 1/18/2023  XIOMARA 7 TOTAL SCORE: 4    No flowsheet data found.  Today's PHQ-2 Score:   PHQ-2 ( 1999 Pfizer) 1/12/2023   Q1: Little interest or pleasure in doing things 0   Q2: Feeling down, depressed  or hopeless 0   PHQ-2 Score 0   Q1: Little interest or pleasure in doing things Not at all   Q2: Feeling down, depressed or hopeless Not at all   PHQ-2 Score 0       Have you ever done Advance Care Planning? (For example, a Health Directive, POLST, or a discussion with a medical provider or your loved ones about your wishes): Yes, patient states has an Advance Care Planning document and will bring a copy to the clinic.    Social History     Tobacco Use     Smoking status: Former     Packs/day: 0.00     Years: 5.00     Pack years: 0.00     Types: Cigarettes     Smokeless tobacco: Never     Tobacco comments:     I smoked in college, and I can occasionally smoke as a stress reliever.   Substance Use Topics     Alcohol use: Yes     Comment: occasional       Alcohol Use 1/12/2023   Prescreen: >3 drinks/day or >7 drinks/week? No   Reviewed orders with patient.  Reviewed health maintenance and updated orders accordingly - Yes  Lab work is in process    Breast Cancer Screening:    Breast CA Risk Assessment (FHS-7) 1/12/2023   Do you have a family history of breast, colon, or ovarian cancer? No / Unknown         Patient under 40 years of age: Routine Mammogram Screening not recommended.   Pertinent mammograms are reviewed under the imaging tab.    History of abnormal Pap smear: NO - age 30-65 PAP every 5 years with negative HPV co-testing recommended     Reviewed and updated as needed this visit by clinical staff   Tobacco   Meds              Reviewed and updated as needed this visit by Provider                     Review of Systems   Constitutional: Negative for chills and fever.   HENT: Negative for congestion, ear pain, hearing loss and sore throat.    Eyes: Negative for pain and visual disturbance.   Respiratory: Negative for cough and shortness of breath.    Cardiovascular: Negative for chest pain, palpitations and peripheral edema.   Gastrointestinal: Negative for abdominal pain, constipation, diarrhea, heartburn,  "hematochezia and nausea.   Breasts:  Negative for tenderness, breast mass and discharge.   Genitourinary: Negative for dysuria, frequency, genital sores, hematuria, pelvic pain, urgency, vaginal bleeding and vaginal discharge.   Musculoskeletal: Negative for arthralgias, joint swelling and myalgias.   Skin: Negative for rash.   Neurological: Negative for dizziness, weakness, headaches and paresthesias.   Psychiatric/Behavioral: Negative for mood changes. The patient is nervous/anxious.           OBJECTIVE:   /62 (BP Location: Right arm, Patient Position: Sitting)   Pulse 54   Temp 97.9  F (36.6  C)   Ht 1.562 m (5' 1.5\")   Wt 65.9 kg (145 lb 3.2 oz)   SpO2 97%   BMI 26.99 kg/m    Physical Exam  GENERAL: healthy, alert and no distress  EYES: Eyes grossly normal to inspection, PERRL and conjunctivae and sclerae normal  HENT: ear canals and TM's normal, nose and mouth without ulcers or lesions  NECK: no adenopathy, no asymmetry, masses, or scars and thyroid normal to palpation  RESP: lungs clear to auscultation - no rales, rhonchi or wheezes  BREAST: normal without masses, tenderness or nipple discharge and no palpable axillary masses or adenopathy  CV: regular rate and rhythm, normal S1 S2, no S3 or S4, no murmur, click or rub, no peripheral edema and peripheral pulses strong  ABDOMEN: soft, nontender, no hepatosplenomegaly, no masses and bowel sounds normal  MS: no gross musculoskeletal defects noted, no edema  SKIN: no suspicious lesions or rashes  Multiple nevi flat and raised, none with concerning features        ASSESSMENT/PLAN:       ICD-10-CM    1. Generalized anxiety disorder  F41.1       2. Routine general medical examination at a health care facility  Z00.00 REVIEW OF HEALTH MAINTENANCE PROTOCOL ORDERS      3. Need for hepatitis C screening test  Z11.59 Hepatitis C Screen Reflex to HCV RNA Quant and Genotype      4. Lipid screening  Z13.220 Lipid panel reflex to direct LDL Fasting      5. " Varicose veins of left lower extremity with pain  I83.812 Vascular Surgery Referral          Patient has been advised of split billing requirements and indicates understanding: Yes      COUNSELING:  Reviewed preventive health counseling, as reflected in patient instructions       Regular exercise       Healthy diet/nutrition       Vision screening       Consider Hep C screening for all patients one time for ages 18-79 years       HIV screeninx in teen years, 1x in adult years, and at intervals if high risk        She reports that she has quit smoking. Her smoking use included cigarettes. She has never used smokeless tobacco.          Kennedi Reid NP  [facility]

## 2023-02-14 ENCOUNTER — ANCILLARY PROCEDURE (OUTPATIENT)
Dept: VASCULAR ULTRASOUND | Facility: CLINIC | Age: 33
End: 2023-02-14
Attending: SPECIALIST
Payer: COMMERCIAL

## 2023-02-14 ENCOUNTER — OFFICE VISIT (OUTPATIENT)
Dept: VASCULAR SURGERY | Facility: CLINIC | Age: 33
End: 2023-02-14
Attending: NURSE PRACTITIONER
Payer: COMMERCIAL

## 2023-02-14 VITALS — RESPIRATION RATE: 16 BRPM | SYSTOLIC BLOOD PRESSURE: 116 MMHG | DIASTOLIC BLOOD PRESSURE: 60 MMHG | HEART RATE: 64 BPM

## 2023-02-14 DIAGNOSIS — I83.812 VARICOSE VEINS OF LEFT LOWER EXTREMITY WITH PAIN: ICD-10-CM

## 2023-02-14 DIAGNOSIS — I83.812 VARICOSE VEINS OF LEFT LOWER EXTREMITY WITH PAIN: Primary | ICD-10-CM

## 2023-02-14 PROCEDURE — 93970 EXTREMITY STUDY: CPT

## 2023-02-14 PROCEDURE — 99204 OFFICE O/P NEW MOD 45 MIN: CPT | Performed by: SPECIALIST

## 2023-02-14 PROCEDURE — G0463 HOSPITAL OUTPT CLINIC VISIT: HCPCS | Mod: 25 | Performed by: SPECIALIST

## 2023-02-14 NOTE — PATIENT INSTRUCTIONS
Selena     Thank you for entrusting your care with us at the [facility].     We are prescribing some compression stockings for you. I have included different suppliers that should help you get measured and fitting to ensure proper fitting socks. You should wear these stockings as much as you can. It is especially important to wear them with long periods of standing, sitting, long car rides or if you will be flying. Compression socks should get refilled every 4-6 months. They do not need to be worn at night while in bed. It is recommended to wear compression level of 20-30mmhg or higher from toes to knees.     We will perform an ultrasound today or prior to your appointment with us in 3 months time to evaluate the valves in your veins.         Varicose Veins      Varicose veins are swollen, enlarged veins most often found in the legs. They are usually blue or purple in color and may bulge, twist, and stand out under the skin.  Normally, veins return blood from the body to the heart. The leg veins have one-way valves that prevent blood from flowing backward in the vein. When the valves are weak or damaged, blood backs up in the veins. This may cause some of the veins to swell and bulge and become varicose veins.      Symptoms    Varicose veins may or may not cause symptoms. If symptoms do occur, they can include:    Legs that feel tired, achy, heavy, or itchy    Leg muscle cramps    Skin changes, such as discoloration, dryness, redness, or rash (in more severe cases, you may also have sores on the skin called venous leg ulcers)      Pain & Discomfort  Pain, itching, swelling, burning, leg heaviness or tiredness, skin discoloration. Symptoms typically worsen throughout the day and are partially relieved by elevation or wearing compression socks or stockings.    Sometimes, varicose veins clot and become painful, hot, hard and discolored. This is called phlebitis, an uncomfortable but  temporary condition that will get better on its own in 2-3 months. Clots associated with phlebitis are limited to surface veins, and not dangerous - unlike clots in the deep veins (deep vein thrombosis or DVT) that are dangerous because they can travel to the heart or lung and require prompt treatment with blood thinners.    Bleeding  A shower or minor trauma can cause a varicose vein to burst and bleed.    Risk Factors    There are a number of factors that increase the risk for varicose veins. These can include:    Being a woman    Being older    Sitting or standing for long periods    Being overweight    Being pregnant    Having a family history of varicose veins    Among other things, veins are responsible for bringing blood back to the heart, sometimes working against gravity. When you walk, muscles in your leg squeeze the veins and help blood flow back into the heart. In normal veins, a series of valves assist this process. With varicose veins and with a related condition called chronic venous insufficiency, poorly functioning valves allow the blood to pool in the lower leg and cause symptoms.     Treatment begins with simple self-help measures (see below). If these don t help, there are many procedures that can be done to shrink or remove varicose veins. Your healthcare provider can tell you more about these options, if needed.    Home care    Support or compression stockings will likely be prescribed. If so, be sure to wear them as directed. They may help improve blood flow.    Exercising helps strengthen your leg muscles and improve blood flow. To get the most benefit, choose exercises such as walking, swimming, or cycling. Also try to exercise for at least 30 minutes on most days.    Raising (elevating) your legs lets gravity help blood flow back to the heart. Sit or lie with your feet above heart level a few times throughout the day, or as directed.    Avoid long periods of sitting or standing. Change  positions often. Also, move your ankles, toes and knees often. This may also help improve blood flow.    If you are overweight, talk with your healthcare provider about setting up a weight-loss plan. Maintaining a healthy weight can help reduce the strain on your veins. It may also improve symptoms, such as swelling and aching.    If you have dryness and itching, ask your provider about special lotions that can be applied to the skin to help improve symptoms.    When to seek medical advice  Call your healthcare provider right away if any of these occur:    Sudden, severe leg swelling, pain, or redness    Symptoms worsen, or they don t improve with self-care    Bleeding from any affected veins    Ulcers form on the legs, ankles, or feet    Fever of 100.4 F (38 C) or higher, or as advised by your provider      Understanding Spider and Varicose Veins    Do you often hide your legs because of the way they look? You may have noticed tiny red or blue bursts (spider veins). Or maybe you have veins that bulge or look twisted (varicose veins). If so, there are treatments that can help    What are the symptoms?  Spider veins or varicose veins may never be a problem. But sometimes they can cause legs to ache or swell. Your legs may also feel heavy and tired, or like they re burning. These symptoms may be more severe at the end of the day. Prolonged sitting or standing can also make your symptoms worse.    Who gets spider and varicose veins?  Anyone can get spider or varicose veins. But vein problems tend to be hereditary (run in families). Other factors that can affect veins include:    Pregnancy, hormones, and birth control pills    A job where you stand or sit a lot    Extra weight or lack of exercise    Age                       What can be done?  Spider and varicose veins can affect the way you feel about yourself. Talk to your healthcare provider about your concerns. There are treatments that can ease symptoms and make  your legs look better.    Your treatment choices  Treatment may include self-care, sclerotherapy (injecting veins with a chemical), surgery, or newer nonsurgical minimally invasive therapies. Spider veins and some varicose veins can be treated with sclerotherapy. Large varicose veins can often be treated with newer minimally invasive procedures and, in rare cases, surgery may be needed.     Please bring your prescription to a home medical supply store. Here is a list of locations but not limited to.     [facility]  [facility]  [address] [place] [address]  Phone: [phone]  Fax: [phone] [facility] Bld[facility]  [address] [place] [address]  Phone: [phone]  Fax: [phone]   [facility]  [address] [place] [address]  Phone: [phone]  Fax: [phone] [facility]  [address]  [address] [place] [address]  Phone: [phone]  Fax: [phone]   [facility]  [address] [place] [address]  Phone: [phone] [facility]   [address] ([facility])  [place] [address]  Phone: [phone]   [facility]  [address] [place] [address]      Phone: [phone]  Fax: [phone] [place]  [address] [place] [address]      Phone: [phone]  Fax: [phone]     [facility] [email or web address]  [address], [place], [place] [id number] [phone]    [facility]  [email or web address]  [address] [place] [address]  Phone: [phone] [address] [place] [address]  Phone: [phone]    [address] [place] [address]  Phone: [phone] [address], [place] [address]  Phone: [phone] [address], [place] [address]  Phone: [phone]    [facility] [email or web address]  [address] [place] [address]  Phone: [phone]    [address] [place] [address]  Phone: [phone] [address] [place] [address]  Phone: [phone]    [facility]  [email or web address]  [phone]            Reference: Smart[employer] and SVS- Dr Eric Snowden: [email or web address]-veins#resource-185      If you have any questions or problems prior to us seeing you at your next scheduled visit please do not hesitate to call us at [phone].    Dr Alfredo Carrion MD & Dawood WARNER RN     Pre-Procedure Instructions for Varicose Vein Ablation   We look forward to scheduling a varicose vein procedure for you. First, we will submit a prior authorization to your insurance company if required. This typically takes 10-14 days. We will contact you once we have gotten the approval to schedule the procedure.  The following is helpful information for you regarding your treatment:    **Important: A  will be needed post procedure.  (Unable to use Taxi or Uber).    Please allow 1-2 hours for your vein procedure appointment.    Take your routine medications as you normally would except for blood thinners. Aspirin is ok to continue.    If you take Warfarin, Xarelto, or Eliquis this will need to be HELD prior to procedure according to primary care provider or cardiology who prescribes this medication. Please notify us if you take this medication.    We have thigh high compression stocking sizes medium to X-large that will be applied immediately after the procedure. You will need to provide your own if one of these sizes will not fit. Another option is to bring in knee high compression and biker compression shorts.     Please wear comfortable clothing.  We recommend that you bring a change  of undergarment in case it gets stained by the cleansing solution.    Feel free to bring a personal music player or a CD to listen to during your procedure.    It is advised not to fly within 3 weeks after the procedure.    You do not have to fast prior to this procedure.  For any questions regarding your procedure please call [phone] to speak with the nurse.  If you would like a Good Gita Estimate for your upcoming procedure contact Cost of Care Estimates at [phone], advocates are available Monday through Friday 8am - 4:30pm.    Radiofrequency Ablation Codes:   - 42906 for first vein in either leg  Varicose veins may be a sign of something more severe - venous reflux disease.  Healthy leg veins have valves that keep blood flowing to the heart. Venous reflux disease develops when the valves stop working properly and allow blood to flow backward (i.e., reflux) and pool in the lower leg veins.   If venous reflux disease is left untreated, symptoms can worsen over time. Your doctor can help you understand if you have this condition.     Superficial venous reflux disease may cause the following signs and symptoms in your legs:    Varicose veins    Aching    Swelling    Cramping    Heaviness or tiredness    Itching    Restlessness    Open skin sores    Superficial venous reflux disease treatment aims to reduce or stop the backward flow of blood. The following may be prescribed to treat your superficial venous reflux disease. Your doctor can help you decide which treatment is best for you:     Compression stockings     Removing diseased vein     Closing diseased vein (through thermal or non-thermal treatment)     Radiofrequency Ablation (RFA) Treatment for Varicose Veins  Radiofrequency ablation (RFA) is a thermal procedure to treat varicose veins. It uses heat created from radiofrequency (RF). During RFA treatment, RF heat is sent into your vein through a thin, flexible tube (catheter). This closes off blood  flow in the main problem vein.           Getting ready for your treatment  Tell your provider if you:    Are pregnant or think you may be pregnant    Are breastfeeding    Smoke, use alcohol or street drugs on a regular basis    Have any allergies or intolerances to certain medicines. Explain what reaction you have had to these medicines in the past.      The day of your treatment    The treatment takes 45 to 60 minutes. The entire treatment (including time to prepare and recover) takes about 1 to 3 hours. You can go home the same day. For the treatment:     You'll lie down on a hospital bed.    An imaging method, such as ultrasound, is used to guide the procedure.    The leg to be treated is injected with numbing medicine.    Once your leg is numb, a needle makes a small hole (puncture) in the vein to be treated.    The catheter with the RF heat source is inserted into your vein.    More numbing medicine may be injected around your vein.    Once the catheter is in the right position, it is then slowly drawn backward. As the catheter sends out heat, the vein is closed off.    In some cases, other side branch varicose veins may be removed or tied off through a few small cuts (incisions).    When the treatment is done, the catheter is removed. Pressure is applied to the insertion site to stop any bleeding. An elastic compression stocking or a bandage may then be put on your leg.       Recovering at home  Once at home, follow all the instructions you've been given. Be sure to:    Check for signs of infection at the catheter insertion sites (see below)    Wear thigh high compressions as directed    Keep your legs raised (elevated) as directed    Walk a few times a day    Avoid heavy exercise, lifting, and standing for long periods as advised. No lifting >20lbs for 2 weeks.     Avoid air travel, hot baths, saunas, or whirlpools as advised    Do not drive or operate heavy machinery for 24 hours after the  procedure    Leakage from the numbing medications the first few hours is normal.          Call your healthcare provider if you have any of the following:    Fever of 100.4 F (38 C) or higher, or as directed by your provider    Chest pain or trouble breathing    Signs of infection at the catheter insertion site. These include increased redness or swelling (inflammation), warmth, increasing pain, bleeding, or bad-smelling discharge.    Severe numbness or tingling in the treated leg    Severe pain or swelling in the treated leg      Follow-up  You'll have an ultrasound within the same week as the procedure to check for problems, such as blood clots. You will follow up with the provider after 6 weeks.   Risks and possible complications   These include the following:  Bleeding, Infection, Blood clots, Damage to the nerves in the treated area, Irritation or burning of the skin over the treated vein. Treatment doesn't improve the look or the symptoms of the problem veins  Risks of any medicines used during the treatment

## 2023-02-14 NOTE — NURSING NOTE
[facility]        Patient is here for a consult to discuss Varicose vein(s). The patient has varicose veins that are problematic in left legs. Symptoms patient has been experiencing are aching and  swelling. Patient states their varicose veins are bothersome when standing and  working. Patient  has been wearing compression stockings. Patient has not been using pain medication or anti-inflammatory's. Patient  has and has not had recent imaging on legs done.    She states that she is had the left leg VV since high school. Been worse the past couple of years with activity. She tries to wear compression daily.    Pt is currently taking no meds that would impact our treatment plan.    /60   Pulse 64   Resp 16     The provider has been notified that the patient has no concerns.     Questions patient would like addressed today are: N/A.    Refills are needed: N/A    Has homecare services and agency name:  Eliza Garces RN

## 2023-02-21 ENCOUNTER — LAB (OUTPATIENT)
Dept: LAB | Facility: CLINIC | Age: 33
End: 2023-02-21
Payer: COMMERCIAL

## 2023-02-21 DIAGNOSIS — Z13.220 LIPID SCREENING: ICD-10-CM

## 2023-02-21 DIAGNOSIS — Z11.59 NEED FOR HEPATITIS C SCREENING TEST: ICD-10-CM

## 2023-02-21 LAB
CHOLEST SERPL-MCNC: 193 MG/DL
HDLC SERPL-MCNC: 59 MG/DL
LDLC SERPL CALC-MCNC: 122 MG/DL
NONHDLC SERPL-MCNC: 134 MG/DL
TRIGL SERPL-MCNC: 60 MG/DL

## 2023-02-21 PROCEDURE — 80061 LIPID PANEL: CPT

## 2023-02-21 PROCEDURE — 36415 COLL VENOUS BLD VENIPUNCTURE: CPT

## 2023-02-21 PROCEDURE — 86803 HEPATITIS C AB TEST: CPT

## 2023-02-22 LAB — HCV AB SERPL QL IA: NONREACTIVE

## 2023-02-23 NOTE — PROGRESS NOTES
[facility] Vein Consult      Assessment:     1. varicose veins, bilateral   2. spider veins, bilateral   3. Insuffiencey of left greater saphenous vein    Plan:     1. Treatment options of conservative therapy of stockings use, exercise, weight loss, elevating legs when possible.    2. Script for compression stockings 20-30 mm hg  3. Ultrasound to evaluate legs for incompetency of both deep and superficial system .   4. Surgical treatment   Endovenous closure ofleft, greater saphenous vein     Risks and benefits of surgical intervention including infection, burns, dvt, thrombophlebitis, not closing, recurrence, numbness and nerve injury and need for further intervention were all discused    5. Follow up: for procedure if approved.   6. Call for any questions concerns or issues    Subjective:      Selena Freeman is a 32 year old female  who was referred by Kennedi Reid  for evaluation of varicose veins. Symptoms include pain, aching, fatigue, burning, edema and dermatitis. Patient has history of leg selling, pain and vein issues that have progressed. Pain and symptoms have affected daily living and work activities needing medications. Here for evaluation today. Stocking use with compression stockings of 20-30 mm hg or greater for greater then 3 months    Allergies:Patient has no known allergies.    History reviewed. No pertinent past medical history.    History reviewed. No pertinent surgical history.      Current Outpatient Medications:      citalopram (CELEXA) 20 MG tablet, Take 1 tablet (20 mg) by mouth daily, Disp: 90 tablet, Rfl: 3     clindamycin (CLINDAGEL) 1 % external gel, Apply topically 2 times daily, Disp: 30 mL, Rfl: 0     levonorgestrel (MIRENA) 20 MCG/DAY IUD, 1 each by Intrauterine route once, Disp: , Rfl:      spironolactone (ALDACTONE) 50 MG tablet, TAKE 1 TABLET BY MOUTH DAILY FOR 2 WEEKS THEN 1 TABLET TWICE DAILY, Disp: , Rfl:     Current Facility-Administered Medications:       lidocaine 112 mL, EPINEPHrine (ADRENALIN) 1.12 mg in sodium chloride 0.9 % 1,113.12 mL (TUMESCENT), , Irrigation, Once, Alfredo Carrion MD     Family History   Problem Relation Age of Onset     Hypertension Mother      Anxiety Disorder Mother      Anxiety Disorder Maternal Grandmother      Anxiety Disorder Sister         reports that she has quit smoking. Her smoking use included cigarettes. She has never used smokeless tobacco. She reports current alcohol use. She reports that she does not use drugs.      Review of Systems:    Pertinent items are noted in HPI.  Patient has symptomatic veins and changes of bilateral legs. These have progressed to the point of causing symptoms on a daily basis. This causes issues with daily activities and chores such as washing dishes, vacuuming, outdoor upkeep and standing for long lengths of time       Objective:     Vitals:    02/14/23 1047   BP: 116/60   Pulse: 64   Resp: 16     There is no height or weight on file to calculate BMI.    EXAM:  GENERAL: This is a well-developed 32 year old female who appears her stated age  HEAD: normocephalic  HEENT: Pupils equal and reactive bilaterally  MOUTH: mucus membranes intact. Normal dentation  CARDIAC: RRR without murmur  CHEST/LUNG:  Clear to auscultation bilaterally  ABDOMEN: Soft, nontender, nondistended, no masses noted   NEUROLOGIC: Focally intact, nonfocal, alert and oriented x 3  INTEGUMENT: No open lesions or ulcers  VASCULAR: Pulses intact, symmetrical upper and lower extremities. There areskin changes consistent with chronic venous insufficiency. Varicose veins present in bilateral greater saphenous distribution. Spider veins present bilateral.                        Side:: Left  VCSS  PAIN:: Mild: Occasional, not restricting activity of requiring pain medication  Varicose Veins:: Mild: Few scattered  Venous Edema:: Absent: None  Skin Pigmentation:: Absent: None  Inflamation:: Absent: None  Induration:: Absent: None  Number  of active ulcers:: 0  Active ulcer duration:: None  Active ulcer diameter:: None  Compression Therapy:: Full compliance stockings + elevation  VCSS Score:: 5  CEAP:: Simple varicose veins only    Imaging:    Exam Information    Exam Date Exam Time Accession # Performing Department Results    2/14/23 11:47 AM [id number] [facility] Imaging [place]      PACS Images     Show images for US Venous Competency Bilateral     Study Result    Narrative & Impression   BILATERAL Venous Insufficiency Ultrasound (Date: 02/14/23)    BILATERAL Lower Extremity          Indication:  Surveillance Bilateral Legs Symptomatic Varicose Veins, Pain and Swelling. More on Left leg.  Lower extremity pain/swelling     Previous: None      Patient History: Swelling     Presenting Symptoms:  Swelling, Varicose Veins and Pain     Technique:   Supine and Upright Ultrasound of the Deep and Superficial Veins with Valsalva and Compression Augmentation Maneuvers. Duplex Imaging is performed utilizing gray-scale, Two-dimensional images, color-flow imaging, Doppler waveform analysis, and Spectral doppler imaging done with provacative maneuvers.      Incompetency Criteria:  Deep vein reflux reported when greater than 1000 msec flow reversal. Superficial vein reflux reported when equal to or greater than 600 msec flow reversal.  vein reflux reported as greater than 350 msec flow reversal.      Right  Leg Deep Veins    CFV SFJ PFV PROX FV   PROX FV MID FV DIST POP V. PERON.   V. PTV'S   Compressibility  (FC,PC,NC) FC FC FC FC FC FC FC FC FC   Reflux -   - - - - -   -         Right Leg Saphenous Veins  Location SFJ PROX THIGH KNEE MID CALF SPJ PROX CALF MID CALF   RT GSV (mm) 8.0 5.1 5.0 3.0         Reflux - - + -         RT SSV (mm)         2.2 2.3 2.8   Reflux         - - -         Left Leg Deep Veins    CFV SFJ PFV PROX SFV PROX SFV MID SFV DIST POP V. PERON. V. PTV'S   Compressibility  (FC,PC,NC) FC FC FC FC  FC FC FC FC FC   Reflux -   - - - + -   -         Lt Leg Saphenous Veins   Location SFJ PROX THIGH KNEE MID CALF SPJ PROX CALF MID CALF   LT GSV (mm) 9.2 6.1 6.6 2.5         Reflux + + + -         LT SSV (mm)         2.1 2.0 2.4   Reflux         - - -         Comments: No incompetent  veins visualized. Left Great Saphenous Vein Varicose Vein Branch Incompetent ( 4.6mm/ 3682 ms)      Impression:       Right Deep Vein Findings: Patent deep venous system with no reflux     Left Deep Vein Findings: Patent deep venous system with reflux in the distal femoral vein     Superficial Vein Findings:      Right Greater Saphenous vein: Patent Greater Saphenous vein with Reflux noted At the knee with a Maximum diameter of 8 mm.     Right Small Saphenous Vein: Patent Small Saphenous Vein without evidence of reflux.     Left Greater Saphenous Vein: Patent Greater Saphenous vein with Reflux noted From the saphenofemoral junction to the knee with a Maximum diameter of 9 mm.     Left Small Saphenous Vein: Patent Small Saphenous Vein without evidence of reflux.     Perforating and Accessory Veins: N/A     Reference:     Compressibility: FC= Fully compressible, PC= Partially compressible, NC= Non-compressible, NV= Not Visualized     Reflux: (+) Incompetent  (-) Competent, (NV) = Not Visualized     Interpretation criteria:          Duration of Retrograde flow (milliseconds)  Category Deep Veins Superficial Veins  veins   Competent < 1000ms < 500ms < 350ms   Incompetent > 1000ms > 500ms > 350ms            Alfredo Carrion MD  General Surgery [phone]  Vascular Surgery [phone]

## 2023-03-29 ENCOUNTER — OFFICE VISIT (OUTPATIENT)
Dept: VASCULAR ULTRASOUND | Facility: CLINIC | Age: 33
End: 2023-03-29
Attending: SPECIALIST
Payer: COMMERCIAL

## 2023-03-29 VITALS
RESPIRATION RATE: 12 BRPM | HEART RATE: 72 BPM | SYSTOLIC BLOOD PRESSURE: 112 MMHG | TEMPERATURE: 98.6 F | DIASTOLIC BLOOD PRESSURE: 68 MMHG

## 2023-03-29 DIAGNOSIS — I83.812 VARICOSE VEINS OF LEFT LOWER EXTREMITY WITH PAIN: Primary | ICD-10-CM

## 2023-03-29 DIAGNOSIS — I87.2 VENOUS (PERIPHERAL) INSUFFICIENCY: ICD-10-CM

## 2023-03-29 PROCEDURE — 258N000003 HC RX IP 258 OP 636: Performed by: SPECIALIST

## 2023-03-29 PROCEDURE — 36475 ENDOVENOUS RF 1ST VEIN: CPT | Mod: LT | Performed by: SPECIALIST

## 2023-03-29 PROCEDURE — C1886 CATHETER, ABLATION: HCPCS

## 2023-03-29 PROCEDURE — 250N000009 HC RX 250: Performed by: SPECIALIST

## 2023-03-29 PROCEDURE — C1894 INTRO/SHEATH, NON-LASER: HCPCS

## 2023-03-29 PROCEDURE — 250N000011 HC RX IP 250 OP 636: Performed by: SPECIALIST

## 2023-03-29 RX ORDER — LIDOCAINE HYDROCHLORIDE 10 MG/ML
5 INJECTION, SOLUTION INFILTRATION; PERINEURAL ONCE
Status: COMPLETED | OUTPATIENT
Start: 2023-03-29 | End: 2023-03-29

## 2023-03-29 RX ADMIN — LIDOCAINE HYDROCHLORIDE 5 ML: 10 INJECTION, SOLUTION INFILTRATION; PERINEURAL at 13:29

## 2023-03-29 RX ADMIN — LIDOCAINE HYDROCHLORIDE: 10 INJECTION, SOLUTION INFILTRATION; PERINEURAL at 13:30

## 2023-03-29 ASSESSMENT — PAIN SCALES - GENERAL
PAINLEVEL: NO PAIN (0)
PAINLEVEL: MILD PAIN (2)

## 2023-03-29 NOTE — PATIENT INSTRUCTIONS
Discharge Instructions Following Venous Closure  Today, you had this procedure done:   - Vein closure using RadioFrequency Ablation (RFA)    Dressing    Leakage from the numbing medications the first few hours is normal if you had an RFA.     Wear your thigh high compression for 48 hours continuously until your ultrasound after the procedure.  It is ok to remove your stocking to shower in 24 hours but then reapply after.    Wear the thigh high stocking for two weeks after the procedure while you are up. It is ok to take off when you sleep.     After two weeks, you can transition into compression stockings of your choice.     Activity    On the day of the procedure, make sure to walk around the house for a few minutes each hour until bedtime.    The day after the procedure you should advance activity as tolerated.  NO strenuous activities though for 2 weeks.  In general, avoid prolonged standing in place and sitting with your legs down.  Both activities will cause blood to pool in your legs resulting in more swelling and discomfort.    Do not drive or operate heavy machinery for 24 hours after the procedure (excludes if you had a VenaSeal).     Do not take baths or use hot tubs or swimming pools until your incision site is healed.    Do not fly for the next 3 weeks.    Do not lift > 20 lbs. for 2 weeks.     Post Procedure Ultrasound & Follow-up  You will need an ultrasound within 2-3 days following the closure procedure.  Then plan to see your surgeon in the office six weeks after your procedure. Call us to schedule this appointment if one has not already been made.  Post Procedure Signs and Symptoms  There can be a mild amount of bruising and numbness after this procedure.  This will typically take a few weeks to fade.  You may feel pain or tenderness in your inner thigh.  Mild pain medication such as Tylenol or Ibuprofen maybe helpful.  It is normal to have fluid leaking from the injection areas the day of the  procedure and it may be blood tinged.      Call your healthcare provider if you have any of the following:    Fever of 100.4 F (38 C) or higher, or as directed by your provider    Chest pain or trouble breathing    Signs of infection at the catheter insertion site. These include increased redness or swelling (inflammation), warmth, increasing pain, bleeding, or bad-smelling discharge.    Severe numbness or tingling in the treated leg    Severe pain or swelling in the treated leg  For emergencies after 4:30pm Monday - Friday, holidays and weekends:  Dr. Alfredo Carrion, [facility] Surgery at [phone]  Dr. Samira Galvan, [facility] Vascular at [phone]  Thank you for allowing us to be part of your care

## 2023-03-31 ENCOUNTER — ANCILLARY PROCEDURE (OUTPATIENT)
Dept: VASCULAR ULTRASOUND | Facility: CLINIC | Age: 33
End: 2023-03-31
Attending: SPECIALIST
Payer: COMMERCIAL

## 2023-03-31 DIAGNOSIS — I83.812 VARICOSE VEINS OF LEFT LOWER EXTREMITY WITH PAIN: ICD-10-CM

## 2023-03-31 PROCEDURE — 93971 EXTREMITY STUDY: CPT | Mod: 26 | Performed by: SURGERY

## 2023-03-31 PROCEDURE — 93971 EXTREMITY STUDY: CPT | Mod: LT

## 2023-05-16 ENCOUNTER — VIRTUAL VISIT (OUTPATIENT)
Dept: VASCULAR SURGERY | Facility: CLINIC | Age: 33
End: 2023-05-16
Attending: SPECIALIST
Payer: COMMERCIAL

## 2023-05-16 DIAGNOSIS — I83.812 VARICOSE VEINS OF LEFT LOWER EXTREMITY WITH PAIN: Primary | ICD-10-CM

## 2023-05-16 PROCEDURE — 99212 OFFICE O/P EST SF 10 MIN: CPT | Mod: VID | Performed by: SPECIALIST

## 2023-05-16 NOTE — PATIENT INSTRUCTIONS
"We would like to see you back in 6 months for a follow up. You can resume your normal activities. It is important to remember that venous reflux disease is a life-long disease. You should wear compression as much as you can. It is especially important to wear compression with long periods of sitting, standing, long car rides or if you will be flying. Compression socks should get refilled every 4-6 months. If you do a lot of standing it is good to do calf raises to help keep the blood pumping. If you sit a lot at work, it is good to get up periodically to walk around. Elevation of the foot of your bed 4-6\" helps the blood return back to where it is needed. They do not need to be worn at night while in bed. We can refill your compression prescription for 1 year otherwise your primary is able to refill them for you.    Call us with any problems or questions: [phone]  Thank you for entrusting us with your care.    "

## 2023-05-16 NOTE — PROGRESS NOTES
Post Procedure Note Endovenous Closure  Virtual visit  Duration 10 minutes    S: Selena Freeman is a 33 year old female S/P Left  greater saphenous vein leg endovenous closure ofLeft  greater saphenous veinlower ext. 6 weeks out from procedure. Doing well, wore female  thigh high socks. Minimal discomfort. Some superficial phlibitis. Which is resolving.     O: There were no vitals filed for this visit.    Status: doing well    General: no apparent distress  Legs look good no signs of infection, incisions healing nicely.           Imaging:    Exam Information    Exam Date Exam Time Accession # Performing Department Results    3/31/23 11:42 AM [id number] [facility] Imaging [place]      PACS Images     Show images for US Venous Post Ablation Leg Left     Study Result    Narrative & Impression   Left Venous Ultrasound Status Post Radiofrequency Ablation (Date: 03/31/23)        Indication: Follow-up saphenous vein Radiofrequency ablation     Date of Procedure: Left 03/29/23      Left CFV/SFJ Compression:  Fully Compressible     Reference:   (FC)-Fully Compressible           (PC)-Partially Compressible   (NC) Non-Compressible     Location Left GSV   Proximal Thigh NC   Mid Thigh NC   Distal Thigh NC   Knee NC   Proximal Calf NC   Mid Calf FC      Impression: Successful ablation of the left leg greater saphenous vein from the proximal thigh to the proximal calf.  No evidence of DVT at the left common femoral vein level.            A/P: S/p endovenous closure. For insuffiencey of veins     May switch to knee high support socks   Resume all activities   RTC 6 months   Call for any questions or concerns     Alfredo Carrion MD   [facility] Surgery

## 2023-05-16 NOTE — NURSING NOTE
Patient confirms medications and allergies are accurate via patients echeck in completion, and or denies any changes since last reviewed/verified.     Is the patient currently in the state of [place]? NO - [place]    Visit mode:VIDEO    If the visit is dropped, the patient can be reconnected by: VIDEO VISIT: Text to cell phone: [phone]    Will anyone else be joining the visit? NO      How would you like to obtain your AVS? MyChart    Are changes needed to the allergy or medication list? NO    Reason for visit: Follow Up          June Del Toro, Virtual Facilitator

## 2023-05-16 NOTE — PROGRESS NOTES
"[facility]        Patient is here for a virtual visit to discuss 7 week follow up    Pt is currently taking no meds that would impact our treatment plan.    Vitals - Patient Reported  Weight (Patient Reported): 64.4 kg (142 lb)  Height (Patient Reported): 156.2 cm (5' 1.5\")  BMI (Based on Pt Reported Ht/Wt): 26.4  Pain Score: No Pain (0)      The provider has been notified that the patient has no concerns.     Questions patient would like addressed today are: N/A.    Refills are needed: No    Has homecare services and agency name:  No    Virtual Visit Details    Type of service:  Video Visit     Originating Location (pt. Location): Home    Distant Location (provider location):  On-site  Platform used for Video Visit: Kamar Del Toro    "

## 2023-06-01 ENCOUNTER — VIRTUAL VISIT (OUTPATIENT)
Dept: FAMILY MEDICINE | Facility: CLINIC | Age: 33
End: 2023-06-01
Payer: COMMERCIAL

## 2023-06-01 DIAGNOSIS — R23.3 EASY BRUISING: ICD-10-CM

## 2023-06-01 DIAGNOSIS — L70.0 ACNE VULGARIS: Primary | ICD-10-CM

## 2023-06-01 PROCEDURE — 99213 OFFICE O/P EST LOW 20 MIN: CPT | Mod: 95 | Performed by: NURSE PRACTITIONER

## 2023-06-01 RX ORDER — SPIRONOLACTONE 50 MG/1
TABLET, FILM COATED ORAL
Qty: 90 TABLET | Refills: 6 | Status: SHIPPED | OUTPATIENT
Start: 2023-06-01 | End: 2023-10-18

## 2023-06-01 NOTE — PROGRESS NOTES
"Selena is a 33 year old who is being evaluated via a billable telephone visit.      What phone number would you like to be contacted at? [phone]  How would you like to obtain your AVS? Cesar    Distant Location (provider location):  On-site    Assessment & Plan     (L70.0) Acne vulgaris  (primary encounter diagnosis)  Comment: she will schedule lab work  Plan: spironolactone (ALDACTONE) 50 MG tablet, Basic         metabolic panel            (R23.3) Easy bruising  Comment:   Plan: Ferritin, CBC with Platelets & Differential,         Iron & Iron Binding Capacity                       BMI:   Estimated body mass index is 26.99 kg/m  as calculated from the following:    Height as of 1/18/23: 1.562 m (5' 1.5\").    Weight as of 1/18/23: 65.9 kg (145 lb 3.2 oz).           Kennedi Reid NP  [facility]    Subjective   Selena is a 33 year old, presenting for the following health issues:  Her dermatologist prescribed spironolactone about a year ago. It has helped a lot with cystic acne, she only increases use to bid when acne flares. Using for about 1 year.  Has not had potassium checked.    While she is getting blood work, also would like to check iron level, notes easy bruising. Denies using any blood thinners/asa/nsaids.  No chief complaint on file.         View : No data to display.              History of Present Illness       Reason for visit:  Follow up for a refill request    She eats 2-3 servings of fruits and vegetables daily.She consumes 1 sweetened beverage(s) daily.She exercises with enough effort to increase her heart rate 30 to 60 minutes per day.  She exercises with enough effort to increase her heart rate 5 days per week.   She is taking medications regularly.               Review of Systems         Objective           Vitals:  No vitals were obtained today due to virtual visit.    Physical Exam   healthy, alert and no distress  PSYCH: Alert and oriented times 3; coherent " speech, normal   rate and volume, able to articulate logical thoughts, able   to abstract reason, no tangential thoughts, no hallucinations   or delusions  Her affect is normal  RESP: No cough, no audible wheezing, able to talk in full sentences  Remainder of exam unable to be completed due to telephone visits                Phone call duration: 10 minutes

## 2023-06-06 ENCOUNTER — LAB (OUTPATIENT)
Dept: LAB | Facility: CLINIC | Age: 33
End: 2023-06-06
Payer: COMMERCIAL

## 2023-06-06 DIAGNOSIS — R23.3 EASY BRUISING: ICD-10-CM

## 2023-06-06 DIAGNOSIS — L70.0 ACNE VULGARIS: ICD-10-CM

## 2023-06-06 LAB
ANION GAP SERPL CALCULATED.3IONS-SCNC: 10 MMOL/L (ref 7–15)
BASOPHILS # BLD AUTO: 0 10E3/UL (ref 0–0.2)
BASOPHILS NFR BLD AUTO: 0 %
BUN SERPL-MCNC: 9.5 MG/DL (ref 6–20)
CALCIUM SERPL-MCNC: 9.7 MG/DL (ref 8.6–10)
CHLORIDE SERPL-SCNC: 107 MMOL/L (ref 98–107)
CREAT SERPL-MCNC: 0.84 MG/DL (ref 0.51–0.95)
DEPRECATED HCO3 PLAS-SCNC: 27 MMOL/L (ref 22–29)
EOSINOPHIL # BLD AUTO: 0.2 10E3/UL (ref 0–0.7)
EOSINOPHIL NFR BLD AUTO: 2 %
ERYTHROCYTE [DISTWIDTH] IN BLOOD BY AUTOMATED COUNT: 12.7 % (ref 10–15)
FERRITIN SERPL-MCNC: 42 NG/ML (ref 6–175)
GFR SERPL CREATININE-BSD FRML MDRD: >90 ML/MIN/1.73M2
GLUCOSE SERPL-MCNC: 83 MG/DL (ref 70–99)
HCT VFR BLD AUTO: 38.7 % (ref 35–47)
HGB BLD-MCNC: 12.9 G/DL (ref 11.7–15.7)
IMM GRANULOCYTES # BLD: 0 10E3/UL
IMM GRANULOCYTES NFR BLD: 0 %
IRON BINDING CAPACITY (ROCHE): 267 UG/DL (ref 240–430)
IRON SATN MFR SERPL: 40 % (ref 15–46)
IRON SERPL-MCNC: 108 UG/DL (ref 37–145)
LYMPHOCYTES # BLD AUTO: 1.5 10E3/UL (ref 0.8–5.3)
LYMPHOCYTES NFR BLD AUTO: 20 %
MCH RBC QN AUTO: 30.6 PG (ref 26.5–33)
MCHC RBC AUTO-ENTMCNC: 33.3 G/DL (ref 31.5–36.5)
MCV RBC AUTO: 92 FL (ref 78–100)
MONOCYTES # BLD AUTO: 0.5 10E3/UL (ref 0–1.3)
MONOCYTES NFR BLD AUTO: 6 %
NEUTROPHILS # BLD AUTO: 5.2 10E3/UL (ref 1.6–8.3)
NEUTROPHILS NFR BLD AUTO: 71 %
PLATELET # BLD AUTO: 188 10E3/UL (ref 150–450)
POTASSIUM SERPL-SCNC: 3.9 MMOL/L (ref 3.4–5.3)
RBC # BLD AUTO: 4.22 10E6/UL (ref 3.8–5.2)
SODIUM SERPL-SCNC: 144 MMOL/L (ref 136–145)
WBC # BLD AUTO: 7.3 10E3/UL (ref 4–11)

## 2023-06-06 PROCEDURE — 80048 BASIC METABOLIC PNL TOTAL CA: CPT

## 2023-06-06 PROCEDURE — 83550 IRON BINDING TEST: CPT

## 2023-06-06 PROCEDURE — 85025 COMPLETE CBC W/AUTO DIFF WBC: CPT | Mod: QW

## 2023-06-06 PROCEDURE — 36415 COLL VENOUS BLD VENIPUNCTURE: CPT

## 2023-06-06 PROCEDURE — 82728 ASSAY OF FERRITIN: CPT

## 2023-06-06 PROCEDURE — 83540 ASSAY OF IRON: CPT

## 2023-10-18 DIAGNOSIS — L70.0 ACNE VULGARIS: ICD-10-CM

## 2023-10-18 RX ORDER — SPIRONOLACTONE 50 MG/1
TABLET, FILM COATED ORAL
Qty: 90 TABLET | Refills: 1 | Status: SHIPPED | OUTPATIENT
Start: 2023-10-18

## 2023-10-19 DIAGNOSIS — F41.1 GENERALIZED ANXIETY DISORDER: Primary | ICD-10-CM

## 2023-10-19 RX ORDER — CITALOPRAM HYDROBROMIDE 20 MG/1
20 TABLET ORAL DAILY
Qty: 90 TABLET | Refills: 0 | Status: SHIPPED | OUTPATIENT
Start: 2023-10-19

## 2023-10-19 NOTE — TELEPHONE ENCOUNTER
Prescription approved per [facility] Refill Protocol.  Fannie LERMA RN  [facility]

## 2023-12-06 ENCOUNTER — OFFICE VISIT (OUTPATIENT)
Dept: VASCULAR SURGERY | Facility: CLINIC | Age: 33
End: 2023-12-06
Attending: SPECIALIST
Payer: COMMERCIAL

## 2023-12-06 VITALS — SYSTOLIC BLOOD PRESSURE: 118 MMHG | RESPIRATION RATE: 18 BRPM | DIASTOLIC BLOOD PRESSURE: 72 MMHG

## 2023-12-06 DIAGNOSIS — I83.812 VARICOSE VEINS OF LEFT LOWER EXTREMITY WITH PAIN: Primary | ICD-10-CM

## 2023-12-06 PROCEDURE — 99214 OFFICE O/P EST MOD 30 MIN: CPT | Performed by: SPECIALIST

## 2023-12-06 PROCEDURE — 99213 OFFICE O/P EST LOW 20 MIN: CPT | Performed by: SPECIALIST

## 2023-12-06 ASSESSMENT — PAIN SCALES - GENERAL: PAINLEVEL: NO PAIN (0)

## 2023-12-06 NOTE — PROGRESS NOTES
[facility] Surgery Follow up    HPI:    33 year old year old female who returns for a follow up.  She is out from 6 months of closure.  She had a left greater saphenous vein closed radiofrequency ablation.  She is done really quite well her veins have decreased in size and Collapse we can still see where they were.  They do not cause a lot of symptoms but they do ache.  So therefore we look at things today about Options that she has in the future or what kind of direction she can go.    Allergies:Patient has no known allergies.    No past medical history on file.    No past surgical history on file.    CURRENT MEDS:  Current Outpatient Medications   Medication    citalopram (CELEXA) 20 MG tablet    levonorgestrel (MIRENA) 20 MCG/DAY IUD    spironolactone (ALDACTONE) 50 MG tablet     No current facility-administered medications for this visit.       Family History   Problem Relation Age of Onset    Hypertension Mother     Anxiety Disorder Mother     Anxiety Disorder Maternal Grandmother     Anxiety Disorder Sister         reports that she has quit smoking. Her smoking use included cigarettes. She has never used smokeless tobacco. She reports current alcohol use. She reports that she does not use drugs.    Review of Systems:  Negative except some some residual veins in the left inferior to the knee calf region superior medial. Otherwise twelve system of review is negative.      OBJECTIVE:  Vitals:    12/06/23 1148   BP: 118/72   Resp: 18     There is no height or weight on file to calculate BMI.    Status: having discomfort and has discoloration    EXAM:  GENERAL: This is a well-developed 33 year old female who appears her stated age  HEAD: normocephalic  HEENT: Pupils equal and reactive bilaterally  CARDIAC: RRR without murmur  CHEST/LUNG:  Clear to auscultation  ABDOMEN: Soft, nontender, nondistended, no masses    NEUROLOGIC: Focally intact, nonfocal  VASCULAR: Pulses intact, symmetrical upper and lower  extremities.  Some varicose veins along the left superior calf region medial some dilatation but minimal.              Side:: Left  VCSS  PAIN:: Absent: None  Varicose Veins:: Absent: None  Venous Edema:: Absent: None  Skin Pigmentation:: Absent: None  Inflamation:: Absent: None  Induration:: Absent: None  Number of active ulcers:: 0  Active ulcer duration:: None  Active ulcer diameter:: None  Compression Therapy:: Full compliance stockings + elevation  VCSS Score:: 3  CEAP:: Simple varicose veins only    LABS:  Lab Results   Component Value Date    WBC 7.3 06/06/2023    HGB 12.9 06/06/2023    HCT 38.7 06/06/2023    MCV 92 06/06/2023     06/06/2023       Images:       Exam Information    Exam Date Exam Time Accession # Performing Department Results    3/31/23 11:42 AM [id number] [facility] Imaging [place]      PACS Images     Show images for US Venous Post Ablation Leg Left     Study Result    Narrative & Impression   Left Venous Ultrasound Status Post Radiofrequency Ablation (Date: 03/31/23)        Indication: Follow-up saphenous vein Radiofrequency ablation     Date of Procedure: Left 03/29/23      Left CFV/SFJ Compression:  Fully Compressible     Reference:   (FC)-Fully Compressible           (PC)-Partially Compressible   (NC) Non-Compressible     Location Left GSV   Proximal Thigh NC   Mid Thigh NC   Distal Thigh NC   Knee NC   Proximal Calf NC   Mid Calf FC      Impression: Successful ablation of the left leg greater saphenous vein from the proximal thigh to the proximal calf.  No evidence of DVT at the left common femoral vein level.            Assessment/Plan:     Symptomatic varicose veins  Status post closure left greater saphenous vein  Some residual varicose veins in the calf superior region    We discussed today her options which would be conservative management with compression  Intervention with a stab phlebectomy and removal of these veins another option  would be to do sclerotherapy with injections.    After this discussion with the pathophysiology the risks and benefits and the options and she is going to think about it she is leaning towards surgical stab phlebectomy but not sure at this time if she would like to proceed and be laid up for that time.  Discussed the risk of benefits procedure risk anesthesia infection bleeding numbness and for development for the veins in the future and she understands.  She is to think about things get back to us        Alfredo Carrion MD  General Surgery [phone]  Vascular Surgery [phone]            No follow-ups on file.     Alfredo Carrion MD ,MD  [facility] Department of Surgery

## 2023-12-06 NOTE — PATIENT INSTRUCTIONS
"          Surgery for Varicose Veins  If you have large varicose veins, surgery may be the best choice. But it will not prevent new varicose veins from forming. Surgery is most often done in a hospital or surgery center on an outpatient basis.  Varicose vein surgery    What is microphlebectomy?  Phlebectomy (say \"Norman\") is a procedure used to remove varicose veins. These are twisted and enlarged veins near the surface of the skin. The procedure is also called microphlebectomy, ambulatory phlebectomy or stab avulsion.    How is the procedure done?  The procedure is usually done in your doctor's office. You will get medicine to numb the area.  Your doctor will make several tiny cuts (incisions) in the skin. The varicose veins will be removed through the cuts.  You most likely will not need stitches to close the cuts.    What can you expect after the procedure?  Your doctor may wrap your leg in a bandage. You may also wear compression stockings. Your doctor will tell you how long to wear them.  You can go home the same day. You will probably be able to do your usual activities the next day. You may have a little bruising and numbness.  Follow-up care is a key part of your treatment and safety. Be sure to make and go to all appointments, and call your doctor if you are having problems. It's also a good idea to know your test results and keep a list of the medicines you take.    Current as of: December 19, 2022  Author: [facility] Staff  Medical Review:Migdalia Gonzalez MD - Family Medicine & Rolly Winters MD - Family Medicine & Kristian Chance MD - Vascular Surgery    Selena,  Your visit to [facility] for your surgery or procedure is coming soon and we look forward to seeing you! This friendly reminder and pre-procedure checklist will help to ensure your procedure/surgery goes smoothly and meets your expectations. At [facility], our goal is to provide you with a great patient " experience and to deliver genuine, professional care to every patient.   Please complete all the steps in advance of your visit. If you have any questions about the items listed below, please give our office a call. We can be reached at [phone] or visit our website at [email or web address]  for more information.           IF YOU NEED TO RESCHEDULE OR CANCEL YOUR PROCEDURE FOR ANY REASON PLEASE CALL THE CLINIC AS SOON AS POSSIBLE -[phone].    Complete the following checklist before your procedure/surgery    [] Contact your insurance regarding coverage. We will do a prior authorization, but please be aware this doesn't mean you are covered at 100%. If your insurance has changed please notify us.  Please check with your insurance for coverage and your out of pocket.  Stab phlebectomy outpatient procedure code: 58926  Stab phlebectomy in clinic procedure code: 57663  Vein ligation procedure code: 79458  If you would like a Good [facility] Estimate for your upcoming service/procedure contact Cost of Care Estimates at [phone], advocates are available Monday - Friday 8am - 5pm.  You may also submit a request online through your [employer] account.  If your procedure is at [facility] please contact the numbers below for Cost of Care Estimates.   - Facility Charge: [phone]    Anesthesiology charge:  [phone]    For all self-pay, estimate, or anesthesia billing questions at [facility], the contact information is below:  Who to contact: Sophie MEEHAN  [facility] number: [phone]  Prepay number: [phone]    [] Pre-Operative Physical   You will need a Pre-Op physical within 30 days of surgery date from your primary provider.     [] Pre-Operative Medication Instructions  Contact your prescribing provider for instructions before discontinuing any medications including anticoagulants. (Examples: Coumadin, Heparin, Xarelto,  Eliquis, Plavix, Pradaxa, Effiient, Briliant) We would like you stop them 3-5 days before surgery, depending on the medicaion. HOWEVER, please follow the advice of your primary care provider. Most surgeons will have you remain on your aspirin.      Not Applicable    If you take these diabetic medications, please discuss with your primary doctor and follow the hold instructions:   Hold seven (7) days prior for once weekly injectable doses [semaglutide (Ozempic, Wegovy), dulaglutide (Trulicity), exenatide ER (Bydureon), tirzepatide (Mounjaro)]  Hold the day before and day of for once daily injectable GLP-1 agonists [exenatide (Byetta), liraglutide (Saxenda, Victoza)]  Hold seven (7) days for oral semaglutide (Rybelsus)     Tell your healthcare provider about all medicines you take and any allergies you may have.      [] Fasting Requirements  Nothing to eat for 8 hours before surgery unless instructed differently by the surgery nurse.   You may have clear liquids up to two hours before your arrival time (coffee, tea, water, or Gatorade. No cream or milk)  If your primary care provider has instructed you to continue oral medications, you may take them on the morning of surgery with a small sip of water.    No alcohol or smoking after 12:00am the day of surgery.    [] You will receive a call from a surgery nurse 3-5 days prior to surgery.     []DO NOT BRING FMLA WITH TO SURGERY.  These should be sent to the provider's office by fax to [phone]     Discharge Instructions for Varicose Vein Surgery    You had a procedure in which your varicose veins were surgically removed. Here s what you can do following surgery to help with your recovery.  Home care Recommendations for taking care of yourself at home include the following:    Ask someone to help you run errands or do household chores for a few days after surgery.    Keep your legs raised when you re sitting or lying down.    Begin a regular walking program, starting  the day after surgery. Just walk for a few minutes at first; then work up to 5 minutes at a time. Gradually increase to 15 to 20 minutes at a time, 2 to 3 times a day.      Post Op Instructions:    You will be discharged with compression wraps from your toe to thigh and will wear the wraps for 5-7 days. You can remove the wraps to shower after 48 hours. Please ensure you continue wrapping your leg with the compression wrap for at least the next 5 days following surgery. You can transition if it is comfortable enough into thigh high compression stockings  for 1 month. You should then continue to wear your compression as much as possible.     No flying for 3 weeks post vein surgery.    For the first 2 weeks after surgery, avoid sitting and standing for long periods. Also, avoid lifting greater 20 lbs.     Take pain medication as prescribed. You are unable to drive until off of narcotics. Elevate the affected extremity as much as possible.    It is normal to have fluid drainage, some bruising, numbness and discoloration post operation, these are temporary.     Walking will help you recover more quickly.    It is ok to massage areas where the veins were taken out after dressings are removed.    You will receive pain medication after your surgery, take as needed. Take pain medication as prescribed. You are unable to drive until off of narcotics.    Ice use for the first 5-7 days is encouraged on areas of discomfort. 30 minutes on and 30 minutes off.     If you can take anti-inflammatories like motrin, advil, or ibuprofen with every meal or evening. Dose 400-600 mg at each dose. It is good to have food or something in your stomach.    For questions after business hours please call Dr. Alfredo Carrion at [phone] or for Dr. Samira Galvan please call [phone].    For questions during business hours please call [phone]  8:00 am - 4:30 pm    Please do not hesitate to call us for questions or concerns.    After  your stab phlebectomy we would like to see you two weeks after for follow up. If you don't already have a follow up appointment you should be seen at [facility] in 2 weeks.       Call your healthcare provider right away if you have any of the following:  Severe bleeding, redness, or drainage at the incision sites  Development of an ulcer (sore) at the incision sites  Numbness or tingling in legs or feet  Increasing leg pain or swelling  Fever, shaking, or chills  Chest pain or shortness of breath

## 2023-12-06 NOTE — PROGRESS NOTES
[facility]        Patient is here for a post-op to discuss 6 month RFA Lt GSV 03/29/23.     Wears compression. No other concerns    Pt is currently taking no meds that would impact our treatment plan.    /72   Resp 18     The provider has been notified that the patient has no concerns.     Questions patient would like addressed today are: N/A.    Refills are needed: N/A    Has homecare services and agency name:  No

## 2024-03-10 ENCOUNTER — HEALTH MAINTENANCE LETTER (OUTPATIENT)
Age: 34
End: 2024-03-10

## 2024-07-07 DIAGNOSIS — L70.0 ACNE VULGARIS: ICD-10-CM

## 2024-07-08 RX ORDER — SPIRONOLACTONE 50 MG/1
TABLET, FILM COATED ORAL
Qty: 90 TABLET | Refills: 1 | OUTPATIENT
Start: 2024-07-08

## 2024-08-20 DIAGNOSIS — L70.0 ACNE VULGARIS: ICD-10-CM

## 2024-08-21 RX ORDER — SPIRONOLACTONE 50 MG/1
TABLET, FILM COATED ORAL
Refills: 0 | OUTPATIENT
Start: 2024-08-21

## 2024-12-12 DIAGNOSIS — L70.0 ACNE VULGARIS: ICD-10-CM

## 2024-12-12 RX ORDER — SPIRONOLACTONE 50 MG/1
TABLET, FILM COATED ORAL
Qty: 90 TABLET | Refills: 1 | OUTPATIENT
Start: 2024-12-12

## 2025-03-16 ENCOUNTER — HEALTH MAINTENANCE LETTER (OUTPATIENT)
Age: 35
End: 2025-03-16

## 2025-03-17 NOTE — NURSING NOTE
Depression Screening Entered On:  9/25/2019 2:28 PM CDT    Performed On:  9/13/2019 2:27 PM CDT by Maribell Ruiz               Depression Screening   Little Interest - Pleasure in Activities :   Not at all   Feeling Down, Depressed, Hopeless :   Not at all   Initial Depression Screen Score :   0    Trouble Falling or Staying Asleep :   Not at all   Feeling Tired or Little Energy :   Several days   Poor Appetite or Overeating :   Not at all   Feeling Bad About Yourself :   Not at all   Trouble Concentrating :   Several days   Moving or Speaking Slowly :   Not at all   Thoughts Better Off Dead or Hurting Self :   Not at all   Detailed Depression Screen Score :   2    Total Depression Screen Score :   2    XIOMARA Difficulty with Work, Home, Others :   Somewhat difficult   Maribell Ruiz - 9/25/2019 2:27 PM CDT   Wt Readings from Last 3 Encounters:   03/17/25 73.2 kg (161 lb 6.4 oz)   02/27/24 77.1 kg (170 lb)   07/17/23 73.5 kg (162 lb)